# Patient Record
Sex: MALE | Race: WHITE | Employment: OTHER | URBAN - METROPOLITAN AREA
[De-identification: names, ages, dates, MRNs, and addresses within clinical notes are randomized per-mention and may not be internally consistent; named-entity substitution may affect disease eponyms.]

---

## 2020-01-01 ENCOUNTER — APPOINTMENT (OUTPATIENT)
Dept: GENERAL RADIOLOGY | Age: 80
DRG: 208 | End: 2020-01-01
Payer: MEDICARE

## 2020-01-01 ENCOUNTER — HOSPITAL ENCOUNTER (INPATIENT)
Age: 80
LOS: 9 days | DRG: 208 | End: 2020-12-14
Attending: EMERGENCY MEDICINE | Admitting: INTERNAL MEDICINE
Payer: MEDICARE

## 2020-01-01 VITALS
DIASTOLIC BLOOD PRESSURE: 45 MMHG | OXYGEN SATURATION: 81 % | SYSTOLIC BLOOD PRESSURE: 63 MMHG | RESPIRATION RATE: 18 BRPM | BODY MASS INDEX: 34.52 KG/M2 | WEIGHT: 254.85 LBS | TEMPERATURE: 93.5 F | HEART RATE: 105 BPM | HEIGHT: 72 IN

## 2020-01-01 LAB
A/G RATIO: 0.8 (ref 1.1–2.2)
A/G RATIO: 0.9 (ref 1.1–2.2)
A/G RATIO: 1 (ref 1.1–2.2)
A/G RATIO: 1.1 (ref 1.1–2.2)
A/G RATIO: 1.3 (ref 1.1–2.2)
ABO/RH: NORMAL
ABO/RH: NORMAL
ALBUMIN SERPL-MCNC: 2.4 G/DL (ref 3.4–5)
ALBUMIN SERPL-MCNC: 2.6 G/DL (ref 3.4–5)
ALBUMIN SERPL-MCNC: 2.7 G/DL (ref 3.4–5)
ALBUMIN SERPL-MCNC: 2.8 G/DL (ref 3.4–5)
ALBUMIN SERPL-MCNC: 2.9 G/DL (ref 3.4–5)
ALBUMIN SERPL-MCNC: 3 G/DL (ref 3.4–5)
ALBUMIN SERPL-MCNC: 3.3 G/DL (ref 3.4–5)
ALBUMIN SERPL-MCNC: 3.8 G/DL (ref 3.4–5)
ALP BLD-CCNC: 74 U/L (ref 40–129)
ALP BLD-CCNC: 76 U/L (ref 40–129)
ALP BLD-CCNC: 77 U/L (ref 40–129)
ALP BLD-CCNC: 78 U/L (ref 40–129)
ALP BLD-CCNC: 78 U/L (ref 40–129)
ALP BLD-CCNC: 81 U/L (ref 40–129)
ALP BLD-CCNC: 83 U/L (ref 40–129)
ALP BLD-CCNC: 83 U/L (ref 40–129)
ALP BLD-CCNC: 84 U/L (ref 40–129)
ALP BLD-CCNC: 85 U/L (ref 40–129)
ALT SERPL-CCNC: 40 U/L (ref 10–40)
ALT SERPL-CCNC: 4446 U/L (ref 10–40)
ALT SERPL-CCNC: 47 U/L (ref 10–40)
ALT SERPL-CCNC: 48 U/L (ref 10–40)
ALT SERPL-CCNC: 50 U/L (ref 10–40)
ALT SERPL-CCNC: 50 U/L (ref 10–40)
ALT SERPL-CCNC: 51 U/L (ref 10–40)
ALT SERPL-CCNC: 53 U/L (ref 10–40)
ALT SERPL-CCNC: 60 U/L (ref 10–40)
ALT SERPL-CCNC: 63 U/L (ref 10–40)
ANION GAP SERPL CALCULATED.3IONS-SCNC: 10 MMOL/L (ref 3–16)
ANION GAP SERPL CALCULATED.3IONS-SCNC: 13 MMOL/L (ref 3–16)
ANION GAP SERPL CALCULATED.3IONS-SCNC: 16 MMOL/L (ref 3–16)
ANION GAP SERPL CALCULATED.3IONS-SCNC: 27 MMOL/L (ref 3–16)
ANION GAP SERPL CALCULATED.3IONS-SCNC: 8 MMOL/L (ref 3–16)
ANION GAP SERPL CALCULATED.3IONS-SCNC: 8 MMOL/L (ref 3–16)
ANION GAP SERPL CALCULATED.3IONS-SCNC: 9 MMOL/L (ref 3–16)
ANION GAP SERPL CALCULATED.3IONS-SCNC: 9 MMOL/L (ref 3–16)
ANTIBODY SCREEN: NORMAL
ANTIBODY SCREEN: NORMAL
AST SERPL-CCNC: 16 U/L (ref 15–37)
AST SERPL-CCNC: 18 U/L (ref 15–37)
AST SERPL-CCNC: 21 U/L (ref 15–37)
AST SERPL-CCNC: 23 U/L (ref 15–37)
AST SERPL-CCNC: 25 U/L (ref 15–37)
AST SERPL-CCNC: 3249 U/L (ref 15–37)
AST SERPL-CCNC: 37 U/L (ref 15–37)
AST SERPL-CCNC: 41 U/L (ref 15–37)
AST SERPL-CCNC: 43 U/L (ref 15–37)
AST SERPL-CCNC: 48 U/L (ref 15–37)
ATYPICAL LYMPHOCYTE RELATIVE PERCENT: 2 % (ref 0–6)
ATYPICAL LYMPHOCYTE RELATIVE PERCENT: 3 % (ref 0–6)
BANDED NEUTROPHILS RELATIVE PERCENT: 1 % (ref 0–7)
BANDED NEUTROPHILS RELATIVE PERCENT: 2 % (ref 0–7)
BANDED NEUTROPHILS RELATIVE PERCENT: 6 % (ref 0–7)
BASE EXCESS ARTERIAL: -26 (ref -3–3)
BASOPHILS ABSOLUTE: 0 K/UL (ref 0–0.2)
BASOPHILS RELATIVE PERCENT: 0 %
BASOPHILS RELATIVE PERCENT: 0.2 %
BASOPHILS RELATIVE PERCENT: 0.2 %
BILIRUB SERPL-MCNC: 0.6 MG/DL (ref 0–1)
BILIRUB SERPL-MCNC: 0.6 MG/DL (ref 0–1)
BILIRUB SERPL-MCNC: 0.7 MG/DL (ref 0–1)
BILIRUB SERPL-MCNC: 0.8 MG/DL (ref 0–1)
BILIRUB SERPL-MCNC: 0.8 MG/DL (ref 0–1)
BILIRUB SERPL-MCNC: 0.9 MG/DL (ref 0–1)
BILIRUB SERPL-MCNC: 0.9 MG/DL (ref 0–1)
BILIRUB SERPL-MCNC: 1 MG/DL (ref 0–1)
BILIRUB SERPL-MCNC: 1.1 MG/DL (ref 0–1)
BILIRUB SERPL-MCNC: 1.2 MG/DL (ref 0–1)
BLOOD BANK DISPENSE STATUS: NORMAL
BLOOD BANK PRODUCT CODE: NORMAL
BLOOD CULTURE, ROUTINE: NORMAL
BPU ID: NORMAL
BUN BLDV-MCNC: 100 MG/DL (ref 7–20)
BUN BLDV-MCNC: 29 MG/DL (ref 7–20)
BUN BLDV-MCNC: 31 MG/DL (ref 7–20)
BUN BLDV-MCNC: 32 MG/DL (ref 7–20)
BUN BLDV-MCNC: 33 MG/DL (ref 7–20)
BUN BLDV-MCNC: 35 MG/DL (ref 7–20)
BUN BLDV-MCNC: 37 MG/DL (ref 7–20)
BUN BLDV-MCNC: 39 MG/DL (ref 7–20)
BUN BLDV-MCNC: 40 MG/DL (ref 7–20)
BUN BLDV-MCNC: 41 MG/DL (ref 7–20)
BURR CELLS: ABNORMAL
C DIFF TOXIN/ANTIGEN: NORMAL
C-REACTIVE PROTEIN: 16.8 MG/L (ref 0–5.1)
C-REACTIVE PROTEIN: 5.9 MG/L (ref 0–5.1)
C-REACTIVE PROTEIN: 6.2 MG/L (ref 0–5.1)
C-REACTIVE PROTEIN: 7.3 MG/L (ref 0–5.1)
CALCIUM SERPL-MCNC: 10.7 MG/DL (ref 8.3–10.6)
CALCIUM SERPL-MCNC: 8.5 MG/DL (ref 8.3–10.6)
CALCIUM SERPL-MCNC: 8.6 MG/DL (ref 8.3–10.6)
CALCIUM SERPL-MCNC: 8.6 MG/DL (ref 8.3–10.6)
CALCIUM SERPL-MCNC: 8.7 MG/DL (ref 8.3–10.6)
CALCIUM SERPL-MCNC: 8.9 MG/DL (ref 8.3–10.6)
CALCIUM SERPL-MCNC: 8.9 MG/DL (ref 8.3–10.6)
CALCIUM SERPL-MCNC: 9.7 MG/DL (ref 8.3–10.6)
CHLORIDE BLD-SCNC: 103 MMOL/L (ref 99–110)
CHLORIDE BLD-SCNC: 103 MMOL/L (ref 99–110)
CHLORIDE BLD-SCNC: 104 MMOL/L (ref 99–110)
CHLORIDE BLD-SCNC: 104 MMOL/L (ref 99–110)
CHLORIDE BLD-SCNC: 106 MMOL/L (ref 99–110)
CHLORIDE BLD-SCNC: 107 MMOL/L (ref 99–110)
CHLORIDE BLD-SCNC: 108 MMOL/L (ref 99–110)
CHLORIDE BLD-SCNC: 108 MMOL/L (ref 99–110)
CHLORIDE BLD-SCNC: 109 MMOL/L (ref 99–110)
CHLORIDE BLD-SCNC: 98 MMOL/L (ref 99–110)
CO2: 14 MMOL/L (ref 21–32)
CO2: 17 MMOL/L (ref 21–32)
CO2: 17 MMOL/L (ref 21–32)
CO2: 18 MMOL/L (ref 21–32)
CO2: 19 MMOL/L (ref 21–32)
CO2: 20 MMOL/L (ref 21–32)
CO2: 20 MMOL/L (ref 21–32)
CO2: 21 MMOL/L (ref 21–32)
CO2: 22 MMOL/L (ref 21–32)
CO2: 22 MMOL/L (ref 21–32)
CREAT SERPL-MCNC: 0.7 MG/DL (ref 0.8–1.3)
CREAT SERPL-MCNC: 0.8 MG/DL (ref 0.8–1.3)
CREAT SERPL-MCNC: 0.9 MG/DL (ref 0.8–1.3)
CREAT SERPL-MCNC: 1.1 MG/DL (ref 0.8–1.3)
CREAT SERPL-MCNC: 1.8 MG/DL (ref 0.8–1.3)
CULTURE, BLOOD 2: NORMAL
D DIMER: 1555 NG/ML DDU (ref 0–229)
D DIMER: 763 NG/ML DDU (ref 0–229)
D DIMER: 868 NG/ML DDU (ref 0–229)
D DIMER: 877 NG/ML DDU (ref 0–229)
DESCRIPTION BLOOD BANK: NORMAL
EKG ATRIAL RATE: 101 BPM
EKG DIAGNOSIS: NORMAL
EKG P AXIS: 86 DEGREES
EKG P-R INTERVAL: 224 MS
EKG Q-T INTERVAL: 360 MS
EKG QRS DURATION: 138 MS
EKG QTC CALCULATION (BAZETT): 466 MS
EKG R AXIS: -49 DEGREES
EKG T AXIS: 49 DEGREES
EKG VENTRICULAR RATE: 101 BPM
EMERGENCY ISSUE BLOOD PRODUCTS SIGNED FORM: NORMAL
EOSINOPHILS ABSOLUTE: 0 K/UL (ref 0–0.6)
EOSINOPHILS RELATIVE PERCENT: 0 %
FERRITIN: 458.2 NG/ML (ref 30–400)
FIBRINOGEN: 382 MG/DL (ref 200–397)
GFR AFRICAN AMERICAN: 44
GFR AFRICAN AMERICAN: >60
GFR NON-AFRICAN AMERICAN: 36
GFR NON-AFRICAN AMERICAN: >60
GLOBULIN: 2.1 G/DL
GLOBULIN: 2.8 G/DL
GLOBULIN: 2.9 G/DL
GLOBULIN: 3 G/DL
GLOBULIN: 3.3 G/DL
GLOBULIN: 3.4 G/DL
GLUCOSE BLD-MCNC: 156 MG/DL (ref 70–99)
GLUCOSE BLD-MCNC: 164 MG/DL (ref 70–99)
GLUCOSE BLD-MCNC: 172 MG/DL (ref 70–99)
GLUCOSE BLD-MCNC: 173 MG/DL (ref 70–99)
GLUCOSE BLD-MCNC: 175 MG/DL (ref 70–99)
GLUCOSE BLD-MCNC: 182 MG/DL (ref 70–99)
GLUCOSE BLD-MCNC: 183 MG/DL (ref 70–99)
GLUCOSE BLD-MCNC: 186 MG/DL (ref 70–99)
GLUCOSE BLD-MCNC: 192 MG/DL (ref 70–99)
GLUCOSE BLD-MCNC: 193 MG/DL (ref 70–99)
GLUCOSE BLD-MCNC: 197 MG/DL (ref 70–99)
GLUCOSE BLD-MCNC: 210 MG/DL (ref 70–99)
GLUCOSE BLD-MCNC: 212 MG/DL (ref 70–99)
GLUCOSE BLD-MCNC: 212 MG/DL (ref 70–99)
GLUCOSE BLD-MCNC: 216 MG/DL (ref 70–99)
GLUCOSE BLD-MCNC: 218 MG/DL (ref 70–99)
GLUCOSE BLD-MCNC: 229 MG/DL (ref 70–99)
GLUCOSE BLD-MCNC: 235 MG/DL (ref 70–99)
GLUCOSE BLD-MCNC: 236 MG/DL (ref 70–99)
GLUCOSE BLD-MCNC: 240 MG/DL (ref 70–99)
GLUCOSE BLD-MCNC: 243 MG/DL (ref 70–99)
GLUCOSE BLD-MCNC: 247 MG/DL (ref 70–99)
GLUCOSE BLD-MCNC: 252 MG/DL (ref 70–99)
GLUCOSE BLD-MCNC: 253 MG/DL (ref 70–99)
GLUCOSE BLD-MCNC: 256 MG/DL (ref 70–99)
GLUCOSE BLD-MCNC: 256 MG/DL (ref 70–99)
GLUCOSE BLD-MCNC: 259 MG/DL (ref 70–99)
GLUCOSE BLD-MCNC: 260 MG/DL (ref 70–99)
GLUCOSE BLD-MCNC: 260 MG/DL (ref 70–99)
GLUCOSE BLD-MCNC: 268 MG/DL (ref 70–99)
GLUCOSE BLD-MCNC: 280 MG/DL (ref 70–99)
GLUCOSE BLD-MCNC: 282 MG/DL (ref 70–99)
GLUCOSE BLD-MCNC: 288 MG/DL (ref 70–99)
GLUCOSE BLD-MCNC: 297 MG/DL (ref 70–99)
GLUCOSE BLD-MCNC: 316 MG/DL (ref 70–99)
GLUCOSE BLD-MCNC: 328 MG/DL (ref 70–99)
GLUCOSE BLD-MCNC: 329 MG/DL (ref 70–99)
GLUCOSE BLD-MCNC: 336 MG/DL (ref 70–99)
GLUCOSE BLD-MCNC: 337 MG/DL (ref 70–99)
GLUCOSE BLD-MCNC: 383 MG/DL (ref 70–99)
GLUCOSE BLD-MCNC: 402 MG/DL (ref 70–99)
GLUCOSE BLD-MCNC: 457 MG/DL (ref 70–99)
GLUCOSE BLD-MCNC: 494 MG/DL (ref 70–99)
GLUCOSE BLD-MCNC: 586 MG/DL (ref 70–99)
HCO3 ARTERIAL: 9.3 MMOL/L (ref 21–29)
HCT VFR BLD CALC: 33.4 % (ref 40.5–52.5)
HCT VFR BLD CALC: 43.7 % (ref 40.5–52.5)
HCT VFR BLD CALC: 43.8 % (ref 40.5–52.5)
HCT VFR BLD CALC: 43.9 % (ref 40.5–52.5)
HCT VFR BLD CALC: 45 % (ref 40.5–52.5)
HCT VFR BLD CALC: 45 % (ref 40.5–52.5)
HCT VFR BLD CALC: 45.4 % (ref 40.5–52.5)
HCT VFR BLD CALC: 45.5 % (ref 40.5–52.5)
HCT VFR BLD CALC: 45.7 % (ref 40.5–52.5)
HCT VFR BLD CALC: 50.3 % (ref 40.5–52.5)
HEMOGLOBIN: 10.1 G/DL (ref 13.5–17.5)
HEMOGLOBIN: 14.4 G/DL (ref 13.5–17.5)
HEMOGLOBIN: 14.6 G/DL (ref 13.5–17.5)
HEMOGLOBIN: 14.8 G/DL (ref 13.5–17.5)
HEMOGLOBIN: 14.9 G/DL (ref 13.5–17.5)
HEMOGLOBIN: 14.9 G/DL (ref 13.5–17.5)
HEMOGLOBIN: 15 G/DL (ref 13.5–17.5)
HEMOGLOBIN: 15 G/DL (ref 13.5–17.5)
HEMOGLOBIN: 15.3 G/DL (ref 13.5–17.5)
HEMOGLOBIN: 16.8 G/DL (ref 13.5–17.5)
LACTIC ACID: 18.4 MMOL/L (ref 0.4–2)
LYMPHOCYTES ABSOLUTE: 0.2 K/UL (ref 1–5.1)
LYMPHOCYTES ABSOLUTE: 0.4 K/UL (ref 1–5.1)
LYMPHOCYTES ABSOLUTE: 0.4 K/UL (ref 1–5.1)
LYMPHOCYTES ABSOLUTE: 0.5 K/UL (ref 1–5.1)
LYMPHOCYTES ABSOLUTE: 0.5 K/UL (ref 1–5.1)
LYMPHOCYTES ABSOLUTE: 0.6 K/UL (ref 1–5.1)
LYMPHOCYTES ABSOLUTE: 0.8 K/UL (ref 1–5.1)
LYMPHOCYTES ABSOLUTE: 0.8 K/UL (ref 1–5.1)
LYMPHOCYTES ABSOLUTE: 2 K/UL (ref 1–5.1)
LYMPHOCYTES ABSOLUTE: 2.3 K/UL (ref 1–5.1)
LYMPHOCYTES RELATIVE PERCENT: 1 %
LYMPHOCYTES RELATIVE PERCENT: 17 %
LYMPHOCYTES RELATIVE PERCENT: 4 %
LYMPHOCYTES RELATIVE PERCENT: 4.3 %
LYMPHOCYTES RELATIVE PERCENT: 5.3 %
LYMPHOCYTES RELATIVE PERCENT: 7 %
LYMPHOCYTES RELATIVE PERCENT: 7 %
MACROCYTES: ABNORMAL
MCH RBC QN AUTO: 31.8 PG (ref 26–34)
MCH RBC QN AUTO: 32 PG (ref 26–34)
MCH RBC QN AUTO: 32.2 PG (ref 26–34)
MCH RBC QN AUTO: 32.3 PG (ref 26–34)
MCH RBC QN AUTO: 32.4 PG (ref 26–34)
MCH RBC QN AUTO: 32.5 PG (ref 26–34)
MCH RBC QN AUTO: 32.7 PG (ref 26–34)
MCH RBC QN AUTO: 32.8 PG (ref 26–34)
MCHC RBC AUTO-ENTMCNC: 30.3 G/DL (ref 31–36)
MCHC RBC AUTO-ENTMCNC: 32.9 G/DL (ref 31–36)
MCHC RBC AUTO-ENTMCNC: 33 G/DL (ref 31–36)
MCHC RBC AUTO-ENTMCNC: 33 G/DL (ref 31–36)
MCHC RBC AUTO-ENTMCNC: 33.1 G/DL (ref 31–36)
MCHC RBC AUTO-ENTMCNC: 33.2 G/DL (ref 31–36)
MCHC RBC AUTO-ENTMCNC: 33.2 G/DL (ref 31–36)
MCHC RBC AUTO-ENTMCNC: 33.4 G/DL (ref 31–36)
MCHC RBC AUTO-ENTMCNC: 33.5 G/DL (ref 31–36)
MCHC RBC AUTO-ENTMCNC: 33.6 G/DL (ref 31–36)
MCV RBC AUTO: 104.7 FL (ref 80–100)
MCV RBC AUTO: 96.7 FL (ref 80–100)
MCV RBC AUTO: 97.1 FL (ref 80–100)
MCV RBC AUTO: 97.1 FL (ref 80–100)
MCV RBC AUTO: 97.2 FL (ref 80–100)
MCV RBC AUTO: 97.6 FL (ref 80–100)
MCV RBC AUTO: 97.8 FL (ref 80–100)
MCV RBC AUTO: 98.1 FL (ref 80–100)
MCV RBC AUTO: 98.4 FL (ref 80–100)
MCV RBC AUTO: 98.5 FL (ref 80–100)
METAMYELOCYTES RELATIVE PERCENT: 1 %
METAMYELOCYTES RELATIVE PERCENT: 1 %
METAMYELOCYTES RELATIVE PERCENT: 2 %
METAMYELOCYTES RELATIVE PERCENT: 4 %
MONOCYTES ABSOLUTE: 0.5 K/UL (ref 0–1.3)
MONOCYTES ABSOLUTE: 0.6 K/UL (ref 0–1.3)
MONOCYTES ABSOLUTE: 0.8 K/UL (ref 0–1.3)
MONOCYTES ABSOLUTE: 0.9 K/UL (ref 0–1.3)
MONOCYTES ABSOLUTE: 0.9 K/UL (ref 0–1.3)
MONOCYTES ABSOLUTE: 1.3 K/UL (ref 0–1.3)
MONOCYTES ABSOLUTE: 1.4 K/UL (ref 0–1.3)
MONOCYTES ABSOLUTE: 3.3 K/UL (ref 0–1.3)
MONOCYTES RELATIVE PERCENT: 10 %
MONOCYTES RELATIVE PERCENT: 10.2 %
MONOCYTES RELATIVE PERCENT: 11 %
MONOCYTES RELATIVE PERCENT: 4 %
MONOCYTES RELATIVE PERCENT: 6 %
MONOCYTES RELATIVE PERCENT: 6 %
MONOCYTES RELATIVE PERCENT: 7.6 %
MONOCYTES RELATIVE PERCENT: 8 %
MONOCYTES RELATIVE PERCENT: 8 %
MONOCYTES RELATIVE PERCENT: 9 %
MYELOCYTE PERCENT: 1 %
MYELOCYTE PERCENT: 3 %
MYELOCYTE PERCENT: 4 %
MYELOCYTE PERCENT: 5 %
NEUTROPHILS ABSOLUTE: 11.4 K/UL (ref 1.7–7.7)
NEUTROPHILS ABSOLUTE: 16.4 K/UL (ref 1.7–7.7)
NEUTROPHILS ABSOLUTE: 27.7 K/UL (ref 1.7–7.7)
NEUTROPHILS ABSOLUTE: 6.8 K/UL (ref 1.7–7.7)
NEUTROPHILS ABSOLUTE: 8.5 K/UL (ref 1.7–7.7)
NEUTROPHILS ABSOLUTE: 9 K/UL (ref 1.7–7.7)
NEUTROPHILS ABSOLUTE: 9.1 K/UL (ref 1.7–7.7)
NEUTROPHILS ABSOLUTE: 9.4 K/UL (ref 1.7–7.7)
NEUTROPHILS ABSOLUTE: 9.5 K/UL (ref 1.7–7.7)
NEUTROPHILS ABSOLUTE: 9.7 K/UL (ref 1.7–7.7)
NEUTROPHILS RELATIVE PERCENT: 75 %
NEUTROPHILS RELATIVE PERCENT: 76 %
NEUTROPHILS RELATIVE PERCENT: 78 %
NEUTROPHILS RELATIVE PERCENT: 83 %
NEUTROPHILS RELATIVE PERCENT: 84 %
NEUTROPHILS RELATIVE PERCENT: 84.3 %
NEUTROPHILS RELATIVE PERCENT: 85 %
NEUTROPHILS RELATIVE PERCENT: 86 %
NEUTROPHILS RELATIVE PERCENT: 86 %
NEUTROPHILS RELATIVE PERCENT: 87.9 %
O2 SAT, ARTERIAL: 62 % (ref 93–100)
OCCULT BLOOD DIAGNOSTIC: ABNORMAL
PCO2 ARTERIAL: 70.5 MM HG (ref 35–45)
PDW BLD-RTO: 13.4 % (ref 12.4–15.4)
PDW BLD-RTO: 13.6 % (ref 12.4–15.4)
PDW BLD-RTO: 13.7 % (ref 12.4–15.4)
PDW BLD-RTO: 13.9 % (ref 12.4–15.4)
PDW BLD-RTO: 14 % (ref 12.4–15.4)
PDW BLD-RTO: 14 % (ref 12.4–15.4)
PDW BLD-RTO: 14.5 % (ref 12.4–15.4)
PERFORMED ON: ABNORMAL
PH ARTERIAL: 6.73 (ref 7.35–7.45)
PLATELET # BLD: 175 K/UL (ref 135–450)
PLATELET # BLD: 189 K/UL (ref 135–450)
PLATELET # BLD: 215 K/UL (ref 135–450)
PLATELET # BLD: 216 K/UL (ref 135–450)
PLATELET # BLD: 226 K/UL (ref 135–450)
PLATELET # BLD: 230 K/UL (ref 135–450)
PLATELET # BLD: 240 K/UL (ref 135–450)
PLATELET # BLD: 243 K/UL (ref 135–450)
PLATELET # BLD: 251 K/UL (ref 135–450)
PLATELET # BLD: 256 K/UL (ref 135–450)
PLATELET SLIDE REVIEW: ADEQUATE
PMV BLD AUTO: 10 FL (ref 5–10.5)
PMV BLD AUTO: 10.2 FL (ref 5–10.5)
PMV BLD AUTO: 10.3 FL (ref 5–10.5)
PMV BLD AUTO: 10.3 FL (ref 5–10.5)
PMV BLD AUTO: 10.4 FL (ref 5–10.5)
PMV BLD AUTO: 10.5 FL (ref 5–10.5)
PMV BLD AUTO: 10.9 FL (ref 5–10.5)
PMV BLD AUTO: 11 FL (ref 5–10.5)
PMV BLD AUTO: 11.1 FL (ref 5–10.5)
PMV BLD AUTO: 9.8 FL (ref 5–10.5)
PO2 ARTERIAL: 64.5 MM HG (ref 75–108)
POC SAMPLE TYPE: ABNORMAL
POTASSIUM REFLEX MAGNESIUM: 4.4 MMOL/L (ref 3.5–5.1)
POTASSIUM REFLEX MAGNESIUM: 4.4 MMOL/L (ref 3.5–5.1)
POTASSIUM REFLEX MAGNESIUM: 4.6 MMOL/L (ref 3.5–5.1)
POTASSIUM REFLEX MAGNESIUM: 4.7 MMOL/L (ref 3.5–5.1)
POTASSIUM REFLEX MAGNESIUM: 4.7 MMOL/L (ref 3.5–5.1)
POTASSIUM REFLEX MAGNESIUM: 5 MMOL/L (ref 3.5–5.1)
POTASSIUM REFLEX MAGNESIUM: 5.8 MMOL/L (ref 3.5–5.1)
PRO-BNP: 441 PG/ML (ref 0–449)
PROCALCITONIN: 0.12 NG/ML (ref 0–0.15)
RBC # BLD: 3.19 M/UL (ref 4.2–5.9)
RBC # BLD: 4.43 M/UL (ref 4.2–5.9)
RBC # BLD: 4.49 M/UL (ref 4.2–5.9)
RBC # BLD: 4.51 M/UL (ref 4.2–5.9)
RBC # BLD: 4.58 M/UL (ref 4.2–5.9)
RBC # BLD: 4.62 M/UL (ref 4.2–5.9)
RBC # BLD: 4.63 M/UL (ref 4.2–5.9)
RBC # BLD: 4.67 M/UL (ref 4.2–5.9)
RBC # BLD: 4.73 M/UL (ref 4.2–5.9)
RBC # BLD: 5.14 M/UL (ref 4.2–5.9)
RBC # BLD: NORMAL 10*6/UL
SARS-COV-2: POSITIVE
SLIDE REVIEW: ABNORMAL
SODIUM BLD-SCNC: 133 MMOL/L (ref 136–145)
SODIUM BLD-SCNC: 134 MMOL/L (ref 136–145)
SODIUM BLD-SCNC: 135 MMOL/L (ref 136–145)
SODIUM BLD-SCNC: 136 MMOL/L (ref 136–145)
SODIUM BLD-SCNC: 136 MMOL/L (ref 136–145)
SODIUM BLD-SCNC: 138 MMOL/L (ref 136–145)
SODIUM BLD-SCNC: 139 MMOL/L (ref 136–145)
SODIUM BLD-SCNC: 139 MMOL/L (ref 136–145)
TCO2 ARTERIAL: 12 MMOL/L
TOTAL PROTEIN: 4.5 G/DL (ref 6.4–8.2)
TOTAL PROTEIN: 5.5 G/DL (ref 6.4–8.2)
TOTAL PROTEIN: 5.6 G/DL (ref 6.4–8.2)
TOTAL PROTEIN: 5.6 G/DL (ref 6.4–8.2)
TOTAL PROTEIN: 5.8 G/DL (ref 6.4–8.2)
TOTAL PROTEIN: 5.9 G/DL (ref 6.4–8.2)
TOTAL PROTEIN: 6.7 G/DL (ref 6.4–8.2)
TOTAL PROTEIN: 6.8 G/DL (ref 6.4–8.2)
TROPONIN: 0.02 NG/ML
TROPONIN: 0.07 NG/ML
TROPONIN: <0.01 NG/ML
WBC # BLD: 10.3 K/UL (ref 4–11)
WBC # BLD: 10.7 K/UL (ref 4–11)
WBC # BLD: 11.4 K/UL (ref 4–11)
WBC # BLD: 11.5 K/UL (ref 4–11)
WBC # BLD: 11.6 K/UL (ref 4–11)
WBC # BLD: 12.7 K/UL (ref 4–11)
WBC # BLD: 18 K/UL (ref 4–11)
WBC # BLD: 33.4 K/UL (ref 4–11)
WBC # BLD: 8.1 K/UL (ref 4–11)
WBC # BLD: 9.7 K/UL (ref 4–11)

## 2020-01-01 PROCEDURE — 94761 N-INVAS EAR/PLS OXIMETRY MLT: CPT

## 2020-01-01 PROCEDURE — 2700000000 HC OXYGEN THERAPY PER DAY

## 2020-01-01 PROCEDURE — 2500000003 HC RX 250 WO HCPCS: Performed by: INTERNAL MEDICINE

## 2020-01-01 PROCEDURE — 99233 SBSQ HOSP IP/OBS HIGH 50: CPT | Performed by: INTERNAL MEDICINE

## 2020-01-01 PROCEDURE — 2580000003 HC RX 258: Performed by: INTERNAL MEDICINE

## 2020-01-01 PROCEDURE — 6360000002 HC RX W HCPCS: Performed by: INTERNAL MEDICINE

## 2020-01-01 PROCEDURE — 93010 ELECTROCARDIOGRAM REPORT: CPT | Performed by: INTERNAL MEDICINE

## 2020-01-01 PROCEDURE — 6370000000 HC RX 637 (ALT 250 FOR IP): Performed by: INTERNAL MEDICINE

## 2020-01-01 PROCEDURE — 80053 COMPREHEN METABOLIC PANEL: CPT

## 2020-01-01 PROCEDURE — 85025 COMPLETE CBC W/AUTO DIFF WBC: CPT

## 2020-01-01 PROCEDURE — 2060000000 HC ICU INTERMEDIATE R&B

## 2020-01-01 PROCEDURE — 2580000003 HC RX 258: Performed by: PHYSICIAN ASSISTANT

## 2020-01-01 PROCEDURE — G0328 FECAL BLOOD SCRN IMMUNOASSAY: HCPCS

## 2020-01-01 PROCEDURE — 2000000000 HC ICU R&B

## 2020-01-01 PROCEDURE — 0W3P8ZZ CONTROL BLEEDING IN GASTROINTESTINAL TRACT, VIA NATURAL OR ARTIFICIAL OPENING ENDOSCOPIC: ICD-10-PCS | Performed by: INTERNAL MEDICINE

## 2020-01-01 PROCEDURE — 99291 CRITICAL CARE FIRST HOUR: CPT | Performed by: INTERNAL MEDICINE

## 2020-01-01 PROCEDURE — XW13325 TRANSFUSION OF CONVALESCENT PLASMA (NONAUTOLOGOUS) INTO PERIPHERAL VEIN, PERCUTANEOUS APPROACH, NEW TECHNOLOGY GROUP 5: ICD-10-PCS | Performed by: INTERNAL MEDICINE

## 2020-01-01 PROCEDURE — 85379 FIBRIN DEGRADATION QUANT: CPT

## 2020-01-01 PROCEDURE — 93005 ELECTROCARDIOGRAM TRACING: CPT | Performed by: EMERGENCY MEDICINE

## 2020-01-01 PROCEDURE — C9113 INJ PANTOPRAZOLE SODIUM, VIA: HCPCS | Performed by: INTERNAL MEDICINE

## 2020-01-01 PROCEDURE — XW033E5 INTRODUCTION OF REMDESIVIR ANTI-INFECTIVE INTO PERIPHERAL VEIN, PERCUTANEOUS APPROACH, NEW TECHNOLOGY GROUP 5: ICD-10-PCS | Performed by: INTERNAL MEDICINE

## 2020-01-01 PROCEDURE — 36415 COLL VENOUS BLD VENIPUNCTURE: CPT

## 2020-01-01 PROCEDURE — 97162 PT EVAL MOD COMPLEX 30 MIN: CPT

## 2020-01-01 PROCEDURE — 87449 NOS EACH ORGANISM AG IA: CPT

## 2020-01-01 PROCEDURE — P9016 RBC LEUKOCYTES REDUCED: HCPCS

## 2020-01-01 PROCEDURE — 71045 X-RAY EXAM CHEST 1 VIEW: CPT

## 2020-01-01 PROCEDURE — 88342 IMHCHEM/IMCYTCHM 1ST ANTB: CPT

## 2020-01-01 PROCEDURE — P9017 PLASMA 1 DONOR FRZ W/IN 8 HR: HCPCS

## 2020-01-01 PROCEDURE — 0DB68ZX EXCISION OF STOMACH, VIA NATURAL OR ARTIFICIAL OPENING ENDOSCOPIC, DIAGNOSTIC: ICD-10-PCS | Performed by: INTERNAL MEDICINE

## 2020-01-01 PROCEDURE — 84484 ASSAY OF TROPONIN QUANT: CPT

## 2020-01-01 PROCEDURE — 88305 TISSUE EXAM BY PATHOLOGIST: CPT

## 2020-01-01 PROCEDURE — 86140 C-REACTIVE PROTEIN: CPT

## 2020-01-01 PROCEDURE — 82803 BLOOD GASES ANY COMBINATION: CPT

## 2020-01-01 PROCEDURE — 85384 FIBRINOGEN ACTIVITY: CPT

## 2020-01-01 PROCEDURE — 86901 BLOOD TYPING SEROLOGIC RH(D): CPT

## 2020-01-01 PROCEDURE — 87040 BLOOD CULTURE FOR BACTERIA: CPT

## 2020-01-01 PROCEDURE — 86900 BLOOD TYPING SEROLOGIC ABO: CPT

## 2020-01-01 PROCEDURE — 99223 1ST HOSP IP/OBS HIGH 75: CPT | Performed by: INTERNAL MEDICINE

## 2020-01-01 PROCEDURE — 94770 HC ETCO2 MONITOR DAILY: CPT

## 2020-01-01 PROCEDURE — 97165 OT EVAL LOW COMPLEX 30 MIN: CPT

## 2020-01-01 PROCEDURE — 82728 ASSAY OF FERRITIN: CPT

## 2020-01-01 PROCEDURE — 3609013000 HC EGD TRANSORAL CONTROL BLEEDING ANY METHOD: Performed by: INTERNAL MEDICINE

## 2020-01-01 PROCEDURE — 97530 THERAPEUTIC ACTIVITIES: CPT

## 2020-01-01 PROCEDURE — 3609012400 HC EGD TRANSORAL BIOPSY SINGLE/MULTIPLE: Performed by: INTERNAL MEDICINE

## 2020-01-01 PROCEDURE — 83880 ASSAY OF NATRIURETIC PEPTIDE: CPT

## 2020-01-01 PROCEDURE — 97110 THERAPEUTIC EXERCISES: CPT

## 2020-01-01 PROCEDURE — 2500000003 HC RX 250 WO HCPCS: Performed by: NURSE PRACTITIONER

## 2020-01-01 PROCEDURE — 87324 CLOSTRIDIUM AG IA: CPT

## 2020-01-01 PROCEDURE — 06HY33Z INSERTION OF INFUSION DEVICE INTO LOWER VEIN, PERCUTANEOUS APPROACH: ICD-10-PCS | Performed by: INTERNAL MEDICINE

## 2020-01-01 PROCEDURE — P9040 RBC LEUKOREDUCED IRRADIATED: HCPCS

## 2020-01-01 PROCEDURE — 1200000000 HC SEMI PRIVATE

## 2020-01-01 PROCEDURE — 84145 PROCALCITONIN (PCT): CPT

## 2020-01-01 PROCEDURE — 86923 COMPATIBILITY TEST ELECTRIC: CPT

## 2020-01-01 PROCEDURE — 99283 EMERGENCY DEPT VISIT LOW MDM: CPT

## 2020-01-01 PROCEDURE — 93005 ELECTROCARDIOGRAM TRACING: CPT | Performed by: INTERNAL MEDICINE

## 2020-01-01 PROCEDURE — P9037 PLATE PHERES LEUKOREDU IRRAD: HCPCS

## 2020-01-01 PROCEDURE — 5A1935Z RESPIRATORY VENTILATION, LESS THAN 24 CONSECUTIVE HOURS: ICD-10-PCS | Performed by: INTERNAL MEDICINE

## 2020-01-01 PROCEDURE — 86850 RBC ANTIBODY SCREEN: CPT

## 2020-01-01 PROCEDURE — 94640 AIRWAY INHALATION TREATMENT: CPT

## 2020-01-01 PROCEDURE — 2720000010 HC SURG SUPPLY STERILE: Performed by: INTERNAL MEDICINE

## 2020-01-01 PROCEDURE — 83605 ASSAY OF LACTIC ACID: CPT

## 2020-01-01 PROCEDURE — 6360000002 HC RX W HCPCS: Performed by: PHYSICIAN ASSISTANT

## 2020-01-01 PROCEDURE — 0BH18EZ INSERTION OF ENDOTRACHEAL AIRWAY INTO TRACHEA, VIA NATURAL OR ARTIFICIAL OPENING ENDOSCOPIC: ICD-10-PCS | Performed by: INTERNAL MEDICINE

## 2020-01-01 PROCEDURE — 6370000000 HC RX 637 (ALT 250 FOR IP): Performed by: PHYSICIAN ASSISTANT

## 2020-01-01 PROCEDURE — 94003 VENT MGMT INPAT SUBQ DAY: CPT

## 2020-01-01 PROCEDURE — 86920 COMPATIBILITY TEST SPIN: CPT

## 2020-01-01 PROCEDURE — 94002 VENT MGMT INPAT INIT DAY: CPT

## 2020-01-01 PROCEDURE — 36430 TRANSFUSION BLD/BLD COMPNT: CPT

## 2020-01-01 PROCEDURE — 2709999900 HC NON-CHARGEABLE SUPPLY: Performed by: INTERNAL MEDICINE

## 2020-01-01 RX ORDER — VITAMIN B COMPLEX
1000 TABLET ORAL DAILY
Status: DISCONTINUED | OUTPATIENT
Start: 2020-01-01 | End: 2020-01-01 | Stop reason: HOSPADM

## 2020-01-01 RX ORDER — IPRATROPIUM BROMIDE AND ALBUTEROL SULFATE 2.5; .5 MG/3ML; MG/3ML
1 SOLUTION RESPIRATORY (INHALATION) ONCE
Status: COMPLETED | OUTPATIENT
Start: 2020-01-01 | End: 2020-01-01

## 2020-01-01 RX ORDER — DEXAMETHASONE SODIUM PHOSPHATE 10 MG/ML
10 INJECTION, SOLUTION INTRAMUSCULAR; INTRAVENOUS ONCE
Status: COMPLETED | OUTPATIENT
Start: 2020-01-01 | End: 2020-01-01

## 2020-01-01 RX ORDER — PIOGLITAZONEHYDROCHLORIDE 15 MG/1
15 TABLET ORAL 2 TIMES DAILY
Status: DISCONTINUED | OUTPATIENT
Start: 2020-01-01 | End: 2020-01-01

## 2020-01-01 RX ORDER — PIOGLITAZONEHYDROCHLORIDE 15 MG/1
15 TABLET ORAL 2 TIMES DAILY
COMMUNITY

## 2020-01-01 RX ORDER — NICOTINE POLACRILEX 4 MG
15 LOZENGE BUCCAL PRN
Status: DISCONTINUED | OUTPATIENT
Start: 2020-01-01 | End: 2020-01-01 | Stop reason: HOSPADM

## 2020-01-01 RX ORDER — 0.9 % SODIUM CHLORIDE 0.9 %
30 INTRAVENOUS SOLUTION INTRAVENOUS PRN
Status: DISCONTINUED | OUTPATIENT
Start: 2020-01-01 | End: 2020-01-01 | Stop reason: HOSPADM

## 2020-01-01 RX ORDER — EPINEPHRINE 0.1 MG/ML
SYRINGE (ML) INJECTION
Status: DISPENSED
Start: 2020-01-01 | End: 2020-01-01

## 2020-01-01 RX ORDER — LISINOPRIL 10 MG/1
10 TABLET ORAL DAILY
Status: DISCONTINUED | OUTPATIENT
Start: 2020-01-01 | End: 2020-01-01

## 2020-01-01 RX ORDER — PROMETHAZINE HYDROCHLORIDE 25 MG/1
12.5 TABLET ORAL EVERY 6 HOURS PRN
Status: DISCONTINUED | OUTPATIENT
Start: 2020-01-01 | End: 2020-01-01

## 2020-01-01 RX ORDER — ACETAMINOPHEN 325 MG/1
650 TABLET ORAL EVERY 6 HOURS PRN
Status: DISCONTINUED | OUTPATIENT
Start: 2020-01-01 | End: 2020-01-01

## 2020-01-01 RX ORDER — ACETAMINOPHEN 650 MG/1
650 SUPPOSITORY RECTAL EVERY 6 HOURS PRN
Status: DISCONTINUED | OUTPATIENT
Start: 2020-01-01 | End: 2020-01-01 | Stop reason: HOSPADM

## 2020-01-01 RX ORDER — TAMSULOSIN HYDROCHLORIDE 0.4 MG/1
0.4 CAPSULE ORAL 2 TIMES DAILY
COMMUNITY

## 2020-01-01 RX ORDER — TAMSULOSIN HYDROCHLORIDE 0.4 MG/1
0.4 CAPSULE ORAL DAILY
Status: DISCONTINUED | OUTPATIENT
Start: 2020-01-01 | End: 2020-01-01 | Stop reason: HOSPADM

## 2020-01-01 RX ORDER — SODIUM CHLORIDE 0.9 % (FLUSH) 0.9 %
10 SYRINGE (ML) INJECTION PRN
Status: DISCONTINUED | OUTPATIENT
Start: 2020-01-01 | End: 2020-01-01 | Stop reason: HOSPADM

## 2020-01-01 RX ORDER — LORAZEPAM 2 MG/ML
4 INJECTION INTRAMUSCULAR
Status: DISCONTINUED | OUTPATIENT
Start: 2020-01-01 | End: 2020-01-01 | Stop reason: HOSPADM

## 2020-01-01 RX ORDER — DEXTROSE MONOHYDRATE 25 G/50ML
12.5 INJECTION, SOLUTION INTRAVENOUS PRN
Status: DISCONTINUED | OUTPATIENT
Start: 2020-01-01 | End: 2020-01-01 | Stop reason: HOSPADM

## 2020-01-01 RX ORDER — ASPIRIN 81 MG/1
81 TABLET, CHEWABLE ORAL DAILY
COMMUNITY

## 2020-01-01 RX ORDER — ROCURONIUM BROMIDE 10 MG/ML
INJECTION, SOLUTION INTRAVENOUS
Status: DISPENSED
Start: 2020-01-01 | End: 2020-01-01

## 2020-01-01 RX ORDER — HYDROCODONE BITARTRATE AND ACETAMINOPHEN 5; 325 MG/1; MG/1
1 TABLET ORAL EVERY 4 HOURS PRN
Status: DISCONTINUED | OUTPATIENT
Start: 2020-01-01 | End: 2020-01-01 | Stop reason: HOSPADM

## 2020-01-01 RX ORDER — DILTIAZEM HYDROCHLORIDE 5 MG/ML
10 INJECTION INTRAVENOUS ONCE
Status: COMPLETED | OUTPATIENT
Start: 2020-01-01 | End: 2020-01-01

## 2020-01-01 RX ORDER — DEXAMETHASONE 4 MG/1
4 TABLET ORAL EVERY 12 HOURS SCHEDULED
Status: DISCONTINUED | OUTPATIENT
Start: 2020-01-01 | End: 2020-01-01

## 2020-01-01 RX ORDER — DEXAMETHASONE 4 MG/1
6 TABLET ORAL DAILY
Status: DISCONTINUED | OUTPATIENT
Start: 2020-01-01 | End: 2020-01-01

## 2020-01-01 RX ORDER — DEXTROSE MONOHYDRATE 50 MG/ML
100 INJECTION, SOLUTION INTRAVENOUS PRN
Status: DISCONTINUED | OUTPATIENT
Start: 2020-01-01 | End: 2020-01-01 | Stop reason: HOSPADM

## 2020-01-01 RX ORDER — MELOXICAM 15 MG/1
15 TABLET ORAL DAILY
COMMUNITY

## 2020-01-01 RX ORDER — METHYLPREDNISOLONE SODIUM SUCCINATE 40 MG/ML
40 INJECTION, POWDER, LYOPHILIZED, FOR SOLUTION INTRAMUSCULAR; INTRAVENOUS EVERY 6 HOURS
Status: DISCONTINUED | OUTPATIENT
Start: 2020-01-01 | End: 2020-01-01 | Stop reason: HOSPADM

## 2020-01-01 RX ORDER — MORPHINE SULFATE 4 MG/ML
4 INJECTION, SOLUTION INTRAMUSCULAR; INTRAVENOUS
Status: DISCONTINUED | OUTPATIENT
Start: 2020-01-01 | End: 2020-01-01 | Stop reason: HOSPADM

## 2020-01-01 RX ORDER — 0.9 % SODIUM CHLORIDE 0.9 %
20 INTRAVENOUS SOLUTION INTRAVENOUS ONCE
Status: COMPLETED | OUTPATIENT
Start: 2020-01-01 | End: 2020-01-01

## 2020-01-01 RX ORDER — ATORVASTATIN CALCIUM 40 MG/1
40 TABLET, FILM COATED ORAL DAILY
COMMUNITY

## 2020-01-01 RX ORDER — INSULIN LISPRO 100 [IU]/ML
0-12 INJECTION, SOLUTION INTRAVENOUS; SUBCUTANEOUS
Status: DISCONTINUED | OUTPATIENT
Start: 2020-01-01 | End: 2020-01-01 | Stop reason: HOSPADM

## 2020-01-01 RX ORDER — SODIUM CHLORIDE 0.9 % (FLUSH) 0.9 %
10 SYRINGE (ML) INJECTION EVERY 12 HOURS SCHEDULED
Status: DISCONTINUED | OUTPATIENT
Start: 2020-01-01 | End: 2020-01-01 | Stop reason: HOSPADM

## 2020-01-01 RX ORDER — INSULIN LISPRO 100 [IU]/ML
0-3 INJECTION, SOLUTION INTRAVENOUS; SUBCUTANEOUS NIGHTLY
Status: DISCONTINUED | OUTPATIENT
Start: 2020-01-01 | End: 2020-01-01

## 2020-01-01 RX ORDER — ASCORBIC ACID 500 MG
500 TABLET ORAL DAILY
Status: DISCONTINUED | OUTPATIENT
Start: 2020-01-01 | End: 2020-01-01 | Stop reason: HOSPADM

## 2020-01-01 RX ORDER — INSULIN LISPRO 100 [IU]/ML
0-6 INJECTION, SOLUTION INTRAVENOUS; SUBCUTANEOUS
Status: DISCONTINUED | OUTPATIENT
Start: 2020-01-01 | End: 2020-01-01

## 2020-01-01 RX ORDER — POLYETHYLENE GLYCOL 3350 17 G/17G
17 POWDER, FOR SOLUTION ORAL DAILY PRN
Status: DISCONTINUED | OUTPATIENT
Start: 2020-01-01 | End: 2020-01-01 | Stop reason: HOSPADM

## 2020-01-01 RX ORDER — ACETAMINOPHEN 325 MG/1
650 TABLET ORAL EVERY 6 HOURS PRN
Status: DISCONTINUED | OUTPATIENT
Start: 2020-01-01 | End: 2020-01-01 | Stop reason: HOSPADM

## 2020-01-01 RX ORDER — SODIUM CHLORIDE 9 MG/ML
INJECTION, SOLUTION INTRAVENOUS CONTINUOUS
Status: DISCONTINUED | OUTPATIENT
Start: 2020-01-01 | End: 2020-01-01

## 2020-01-01 RX ORDER — ETOMIDATE 2 MG/ML
INJECTION INTRAVENOUS
Status: DISPENSED
Start: 2020-01-01 | End: 2020-01-01

## 2020-01-01 RX ORDER — ONDANSETRON 2 MG/ML
4 INJECTION INTRAMUSCULAR; INTRAVENOUS EVERY 6 HOURS PRN
Status: DISCONTINUED | OUTPATIENT
Start: 2020-01-01 | End: 2020-01-01 | Stop reason: HOSPADM

## 2020-01-01 RX ORDER — PROMETHAZINE HYDROCHLORIDE 25 MG/1
12.5 TABLET ORAL EVERY 6 HOURS PRN
Status: DISCONTINUED | OUTPATIENT
Start: 2020-01-01 | End: 2020-01-01 | Stop reason: HOSPADM

## 2020-01-01 RX ORDER — ACETAMINOPHEN 650 MG/1
650 SUPPOSITORY RECTAL EVERY 6 HOURS PRN
Status: DISCONTINUED | OUTPATIENT
Start: 2020-01-01 | End: 2020-01-01

## 2020-01-01 RX ORDER — ALOGLIPTIN 6.25 MG/1
25 TABLET, FILM COATED ORAL DAILY
Status: DISCONTINUED | OUTPATIENT
Start: 2020-01-01 | End: 2020-01-01

## 2020-01-01 RX ORDER — LISINOPRIL 10 MG/1
10 TABLET ORAL DAILY
COMMUNITY

## 2020-01-01 RX ORDER — EPINEPHRINE 0.1 MG/ML
SYRINGE (ML) INJECTION PRN
Status: DISCONTINUED | OUTPATIENT
Start: 2020-01-01 | End: 2020-01-01 | Stop reason: ALTCHOICE

## 2020-01-01 RX ORDER — ONDANSETRON 2 MG/ML
4 INJECTION INTRAMUSCULAR; INTRAVENOUS EVERY 6 HOURS PRN
Status: DISCONTINUED | OUTPATIENT
Start: 2020-01-01 | End: 2020-01-01

## 2020-01-01 RX ORDER — INSULIN LISPRO 100 [IU]/ML
0-6 INJECTION, SOLUTION INTRAVENOUS; SUBCUTANEOUS NIGHTLY
Status: DISCONTINUED | OUTPATIENT
Start: 2020-01-01 | End: 2020-01-01 | Stop reason: HOSPADM

## 2020-01-01 RX ORDER — ATORVASTATIN CALCIUM 40 MG/1
40 TABLET, FILM COATED ORAL NIGHTLY
Status: DISCONTINUED | OUTPATIENT
Start: 2020-01-01 | End: 2020-01-01

## 2020-01-01 RX ORDER — POLYETHYLENE GLYCOL 3350 17 G/17G
17 POWDER, FOR SOLUTION ORAL DAILY PRN
Status: DISCONTINUED | OUTPATIENT
Start: 2020-01-01 | End: 2020-01-01

## 2020-01-01 RX ORDER — ZINC SULFATE 50(220)MG
50 CAPSULE ORAL DAILY
Status: DISCONTINUED | OUTPATIENT
Start: 2020-01-01 | End: 2020-01-01 | Stop reason: HOSPADM

## 2020-01-01 RX ADMIN — Medication 10 ML: at 21:43

## 2020-01-01 RX ADMIN — Medication 10 ML: at 20:15

## 2020-01-01 RX ADMIN — INSULIN LISPRO 6 UNITS: 100 INJECTION, SOLUTION INTRAVENOUS; SUBCUTANEOUS at 17:51

## 2020-01-01 RX ADMIN — TAMSULOSIN HYDROCHLORIDE 0.4 MG: 0.4 CAPSULE ORAL at 08:05

## 2020-01-01 RX ADMIN — INSULIN LISPRO 3 UNITS: 100 INJECTION, SOLUTION INTRAVENOUS; SUBCUTANEOUS at 21:52

## 2020-01-01 RX ADMIN — PANTOPRAZOLE SODIUM 80 MG: 40 INJECTION, POWDER, FOR SOLUTION INTRAVENOUS at 08:21

## 2020-01-01 RX ADMIN — Medication 10 ML: at 20:26

## 2020-01-01 RX ADMIN — INSULIN LISPRO 6 UNITS: 100 INJECTION, SOLUTION INTRAVENOUS; SUBCUTANEOUS at 12:15

## 2020-01-01 RX ADMIN — INSULIN LISPRO 4 UNITS: 100 INJECTION, SOLUTION INTRAVENOUS; SUBCUTANEOUS at 20:27

## 2020-01-01 RX ADMIN — Medication 10 ML: at 22:33

## 2020-01-01 RX ADMIN — REMDESIVIR 100 MG: 100 INJECTION, POWDER, LYOPHILIZED, FOR SOLUTION INTRAVENOUS at 15:34

## 2020-01-01 RX ADMIN — ENOXAPARIN SODIUM 40 MG: 40 INJECTION SUBCUTANEOUS at 08:52

## 2020-01-01 RX ADMIN — TAMSULOSIN HYDROCHLORIDE 0.4 MG: 0.4 CAPSULE ORAL at 09:00

## 2020-01-01 RX ADMIN — ENOXAPARIN SODIUM 40 MG: 40 INJECTION SUBCUTANEOUS at 09:18

## 2020-01-01 RX ADMIN — Medication 10 ML: at 21:23

## 2020-01-01 RX ADMIN — TAMSULOSIN HYDROCHLORIDE 0.4 MG: 0.4 CAPSULE ORAL at 08:20

## 2020-01-01 RX ADMIN — OXYCODONE HYDROCHLORIDE AND ACETAMINOPHEN 500 MG: 500 TABLET ORAL at 07:53

## 2020-01-01 RX ADMIN — INSULIN LISPRO 2 UNITS: 100 INJECTION, SOLUTION INTRAVENOUS; SUBCUTANEOUS at 21:36

## 2020-01-01 RX ADMIN — INSULIN LISPRO 2 UNITS: 100 INJECTION, SOLUTION INTRAVENOUS; SUBCUTANEOUS at 12:31

## 2020-01-01 RX ADMIN — Medication 10 ML: at 09:19

## 2020-01-01 RX ADMIN — DEXAMETHASONE SODIUM PHOSPHATE 14 MG: 4 INJECTION, SOLUTION INTRA-ARTICULAR; INTRALESIONAL; INTRAMUSCULAR; INTRAVENOUS; SOFT TISSUE at 12:20

## 2020-01-01 RX ADMIN — OXYCODONE HYDROCHLORIDE AND ACETAMINOPHEN 500 MG: 500 TABLET ORAL at 09:47

## 2020-01-01 RX ADMIN — DILTIAZEM HYDROCHLORIDE 10 MG: 5 INJECTION INTRAVENOUS at 01:30

## 2020-01-01 RX ADMIN — Medication 10 ML: at 09:09

## 2020-01-01 RX ADMIN — REMDESIVIR 100 MG: 100 INJECTION, POWDER, LYOPHILIZED, FOR SOLUTION INTRAVENOUS at 13:22

## 2020-01-01 RX ADMIN — INSULIN GLARGINE 10 UNITS: 100 INJECTION, SOLUTION SUBCUTANEOUS at 21:52

## 2020-01-01 RX ADMIN — OXYCODONE HYDROCHLORIDE AND ACETAMINOPHEN 500 MG: 500 TABLET ORAL at 08:05

## 2020-01-01 RX ADMIN — INSULIN LISPRO 2 UNITS: 100 INJECTION, SOLUTION INTRAVENOUS; SUBCUTANEOUS at 08:57

## 2020-01-01 RX ADMIN — Medication 50 MG: at 07:53

## 2020-01-01 RX ADMIN — DEXAMETHASONE SODIUM PHOSPHATE 20 MG: 4 INJECTION, SOLUTION INTRA-ARTICULAR; INTRALESIONAL; INTRAMUSCULAR; INTRAVENOUS; SOFT TISSUE at 12:14

## 2020-01-01 RX ADMIN — SODIUM CHLORIDE 20 ML: 9 INJECTION, SOLUTION INTRAVENOUS at 00:50

## 2020-01-01 RX ADMIN — ENOXAPARIN SODIUM 120 MG: 120 INJECTION SUBCUTANEOUS at 20:10

## 2020-01-01 RX ADMIN — Medication 50 MG: at 08:52

## 2020-01-01 RX ADMIN — INSULIN LISPRO 2 UNITS: 100 INJECTION, SOLUTION INTRAVENOUS; SUBCUTANEOUS at 17:33

## 2020-01-01 RX ADMIN — OXYCODONE HYDROCHLORIDE AND ACETAMINOPHEN 500 MG: 500 TABLET ORAL at 08:19

## 2020-01-01 RX ADMIN — LISINOPRIL 10 MG: 10 TABLET ORAL at 08:19

## 2020-01-01 RX ADMIN — ENOXAPARIN SODIUM 40 MG: 40 INJECTION SUBCUTANEOUS at 07:54

## 2020-01-01 RX ADMIN — ENOXAPARIN SODIUM 120 MG: 120 INJECTION SUBCUTANEOUS at 09:48

## 2020-01-01 RX ADMIN — INSULIN LISPRO 2 UNITS: 100 INJECTION, SOLUTION INTRAVENOUS; SUBCUTANEOUS at 07:55

## 2020-01-01 RX ADMIN — ENOXAPARIN SODIUM 120 MG: 120 INJECTION SUBCUTANEOUS at 20:47

## 2020-01-01 RX ADMIN — Medication 10 ML: at 23:07

## 2020-01-01 RX ADMIN — INSULIN LISPRO 6 UNITS: 100 INJECTION, SOLUTION INTRAVENOUS; SUBCUTANEOUS at 17:32

## 2020-01-01 RX ADMIN — ATORVASTATIN CALCIUM 40 MG: 40 TABLET, FILM COATED ORAL at 20:48

## 2020-01-01 RX ADMIN — LISINOPRIL 10 MG: 10 TABLET ORAL at 08:56

## 2020-01-01 RX ADMIN — PHENYLEPHRINE HYDROCHLORIDE 50 MCG/MIN: 10 INJECTION INTRAVENOUS at 08:54

## 2020-01-01 RX ADMIN — Medication 50 MG: at 09:47

## 2020-01-01 RX ADMIN — TAMSULOSIN HYDROCHLORIDE 0.4 MG: 0.4 CAPSULE ORAL at 09:48

## 2020-01-01 RX ADMIN — INSULIN LISPRO 6 UNITS: 100 INJECTION, SOLUTION INTRAVENOUS; SUBCUTANEOUS at 10:04

## 2020-01-01 RX ADMIN — INSULIN GLARGINE 30 UNITS: 100 INJECTION, SOLUTION SUBCUTANEOUS at 20:13

## 2020-01-01 RX ADMIN — ENOXAPARIN SODIUM 40 MG: 40 INJECTION SUBCUTANEOUS at 08:56

## 2020-01-01 RX ADMIN — ATORVASTATIN CALCIUM 40 MG: 40 TABLET, FILM COATED ORAL at 21:23

## 2020-01-01 RX ADMIN — ENOXAPARIN SODIUM 40 MG: 40 INJECTION SUBCUTANEOUS at 21:13

## 2020-01-01 RX ADMIN — INSULIN LISPRO 8 UNITS: 100 INJECTION, SOLUTION INTRAVENOUS; SUBCUTANEOUS at 17:17

## 2020-01-01 RX ADMIN — AZITHROMYCIN MONOHYDRATE 500 MG: 500 INJECTION, POWDER, LYOPHILIZED, FOR SOLUTION INTRAVENOUS at 12:59

## 2020-01-01 RX ADMIN — METOPROLOL TARTRATE 25 MG: 25 TABLET, FILM COATED ORAL at 09:47

## 2020-01-01 RX ADMIN — SODIUM CHLORIDE 20 ML: 9 INJECTION, SOLUTION INTRAVENOUS at 11:02

## 2020-01-01 RX ADMIN — ALOGLIPTIN 25 MG: 6.25 TABLET, FILM COATED ORAL at 22:17

## 2020-01-01 RX ADMIN — INSULIN LISPRO 5 UNITS: 100 INJECTION, SOLUTION INTRAVENOUS; SUBCUTANEOUS at 20:14

## 2020-01-01 RX ADMIN — INSULIN LISPRO 4 UNITS: 100 INJECTION, SOLUTION INTRAVENOUS; SUBCUTANEOUS at 13:13

## 2020-01-01 RX ADMIN — Medication 10 ML: at 08:20

## 2020-01-01 RX ADMIN — ENOXAPARIN SODIUM 40 MG: 40 INJECTION SUBCUTANEOUS at 07:52

## 2020-01-01 RX ADMIN — INSULIN LISPRO 2 UNITS: 100 INJECTION, SOLUTION INTRAVENOUS; SUBCUTANEOUS at 18:34

## 2020-01-01 RX ADMIN — VASOPRESSIN 0.04 UNITS/MIN: 20 INJECTION INTRAVENOUS at 09:00

## 2020-01-01 RX ADMIN — OXYCODONE HYDROCHLORIDE AND ACETAMINOPHEN 500 MG: 500 TABLET ORAL at 21:14

## 2020-01-01 RX ADMIN — INSULIN LISPRO 4 UNITS: 100 INJECTION, SOLUTION INTRAVENOUS; SUBCUTANEOUS at 13:50

## 2020-01-01 RX ADMIN — CHOLECALCIFEROL TAB 25 MCG (1000 UNIT) 1000 UNITS: 25 TAB at 09:47

## 2020-01-01 RX ADMIN — ENOXAPARIN SODIUM 40 MG: 40 INJECTION SUBCUTANEOUS at 08:05

## 2020-01-01 RX ADMIN — ENOXAPARIN SODIUM 40 MG: 40 INJECTION SUBCUTANEOUS at 21:23

## 2020-01-01 RX ADMIN — INSULIN LISPRO 4 UNITS: 100 INJECTION, SOLUTION INTRAVENOUS; SUBCUTANEOUS at 08:07

## 2020-01-01 RX ADMIN — LISINOPRIL 10 MG: 10 TABLET ORAL at 22:18

## 2020-01-01 RX ADMIN — INSULIN GLARGINE 10 UNITS: 100 INJECTION, SOLUTION SUBCUTANEOUS at 21:14

## 2020-01-01 RX ADMIN — ATORVASTATIN CALCIUM 40 MG: 40 TABLET, FILM COATED ORAL at 20:23

## 2020-01-01 RX ADMIN — INSULIN LISPRO 4 UNITS: 100 INJECTION, SOLUTION INTRAVENOUS; SUBCUTANEOUS at 09:52

## 2020-01-01 RX ADMIN — Medication 10 ML: at 09:08

## 2020-01-01 RX ADMIN — INSULIN GLARGINE 30 UNITS: 100 INJECTION, SOLUTION SUBCUTANEOUS at 09:52

## 2020-01-01 RX ADMIN — ENOXAPARIN SODIUM 120 MG: 120 INJECTION SUBCUTANEOUS at 08:20

## 2020-01-01 RX ADMIN — LISINOPRIL 10 MG: 10 TABLET ORAL at 07:54

## 2020-01-01 RX ADMIN — ATORVASTATIN CALCIUM 40 MG: 40 TABLET, FILM COATED ORAL at 20:16

## 2020-01-01 RX ADMIN — IPRATROPIUM BROMIDE AND ALBUTEROL SULFATE 1 AMPULE: .5; 3 SOLUTION RESPIRATORY (INHALATION) at 12:45

## 2020-01-01 RX ADMIN — Medication 10 ML: at 10:06

## 2020-01-01 RX ADMIN — Medication 10 ML: at 09:18

## 2020-01-01 RX ADMIN — INSULIN LISPRO 4 UNITS: 100 INJECTION, SOLUTION INTRAVENOUS; SUBCUTANEOUS at 12:46

## 2020-01-01 RX ADMIN — INSULIN LISPRO 2 UNITS: 100 INJECTION, SOLUTION INTRAVENOUS; SUBCUTANEOUS at 21:14

## 2020-01-01 RX ADMIN — INSULIN LISPRO 2 UNITS: 100 INJECTION, SOLUTION INTRAVENOUS; SUBCUTANEOUS at 12:14

## 2020-01-01 RX ADMIN — Medication 10 ML: at 21:37

## 2020-01-01 RX ADMIN — DEXAMETHASONE SODIUM PHOSPHATE 20 MG: 4 INJECTION, SOLUTION INTRA-ARTICULAR; INTRALESIONAL; INTRAMUSCULAR; INTRAVENOUS; SOFT TISSUE at 13:10

## 2020-01-01 RX ADMIN — DEXAMETHASONE SODIUM PHOSPHATE 20 MG: 4 INJECTION, SOLUTION INTRA-ARTICULAR; INTRALESIONAL; INTRAMUSCULAR; INTRAVENOUS; SOFT TISSUE at 13:22

## 2020-01-01 RX ADMIN — Medication 10 ML: at 23:08

## 2020-01-01 RX ADMIN — INSULIN GLARGINE 10 UNITS: 100 INJECTION, SOLUTION SUBCUTANEOUS at 21:36

## 2020-01-01 RX ADMIN — INSULIN GLARGINE 30 UNITS: 100 INJECTION, SOLUTION SUBCUTANEOUS at 10:04

## 2020-01-01 RX ADMIN — INSULIN LISPRO 2 UNITS: 100 INJECTION, SOLUTION INTRAVENOUS; SUBCUTANEOUS at 17:46

## 2020-01-01 RX ADMIN — ENOXAPARIN SODIUM 40 MG: 40 INJECTION SUBCUTANEOUS at 20:23

## 2020-01-01 RX ADMIN — Medication 10 ML: at 20:48

## 2020-01-01 RX ADMIN — ATORVASTATIN CALCIUM 40 MG: 40 TABLET, FILM COATED ORAL at 21:52

## 2020-01-01 RX ADMIN — SODIUM CHLORIDE: 9 INJECTION, SOLUTION INTRAVENOUS at 22:17

## 2020-01-01 RX ADMIN — ENOXAPARIN SODIUM 120 MG: 120 INJECTION SUBCUTANEOUS at 20:16

## 2020-01-01 RX ADMIN — INSULIN LISPRO 6 UNITS: 100 INJECTION, SOLUTION INTRAVENOUS; SUBCUTANEOUS at 21:26

## 2020-01-01 RX ADMIN — DEXAMETHASONE 4 MG: 4 TABLET ORAL at 22:18

## 2020-01-01 RX ADMIN — REMDESIVIR 100 MG: 100 INJECTION, POWDER, LYOPHILIZED, FOR SOLUTION INTRAVENOUS at 15:25

## 2020-01-01 RX ADMIN — ATORVASTATIN CALCIUM 40 MG: 40 TABLET, FILM COATED ORAL at 21:14

## 2020-01-01 RX ADMIN — TAMSULOSIN HYDROCHLORIDE 0.4 MG: 0.4 CAPSULE ORAL at 09:16

## 2020-01-01 RX ADMIN — Medication 50 MG: at 08:19

## 2020-01-01 RX ADMIN — Medication 10 ML: at 07:54

## 2020-01-01 RX ADMIN — INSULIN LISPRO 4 UNITS: 100 INJECTION, SOLUTION INTRAVENOUS; SUBCUTANEOUS at 18:24

## 2020-01-01 RX ADMIN — CHOLECALCIFEROL TAB 25 MCG (1000 UNIT) 1000 UNITS: 25 TAB at 07:52

## 2020-01-01 RX ADMIN — DEXAMETHASONE SODIUM PHOSPHATE 10 MG: 10 INJECTION, SOLUTION INTRAMUSCULAR; INTRAVENOUS at 12:51

## 2020-01-01 RX ADMIN — Medication 10 ML: at 02:27

## 2020-01-01 RX ADMIN — TAMSULOSIN HYDROCHLORIDE 0.4 MG: 0.4 CAPSULE ORAL at 07:54

## 2020-01-01 RX ADMIN — Medication 10 ML: at 22:19

## 2020-01-01 RX ADMIN — INSULIN LISPRO 4 UNITS: 100 INJECTION, SOLUTION INTRAVENOUS; SUBCUTANEOUS at 20:48

## 2020-01-01 RX ADMIN — Medication 10 ML: at 09:00

## 2020-01-01 RX ADMIN — LISINOPRIL 10 MG: 10 TABLET ORAL at 09:16

## 2020-01-01 RX ADMIN — Medication 10 ML: at 10:07

## 2020-01-01 RX ADMIN — INSULIN LISPRO 6 UNITS: 100 INJECTION, SOLUTION INTRAVENOUS; SUBCUTANEOUS at 07:53

## 2020-01-01 RX ADMIN — LISINOPRIL 10 MG: 10 TABLET ORAL at 09:47

## 2020-01-01 RX ADMIN — Medication 70 MCG/MIN: at 08:22

## 2020-01-01 RX ADMIN — PIOGLITAZONE 15 MG: 15 TABLET ORAL at 22:17

## 2020-01-01 RX ADMIN — PIOGLITAZONE 15 MG: 15 TABLET ORAL at 09:16

## 2020-01-01 RX ADMIN — ATORVASTATIN CALCIUM 40 MG: 40 TABLET, FILM COATED ORAL at 21:35

## 2020-01-01 RX ADMIN — SODIUM CHLORIDE 20 ML: 9 INJECTION, SOLUTION INTRAVENOUS at 11:01

## 2020-01-01 RX ADMIN — TAMSULOSIN HYDROCHLORIDE 0.4 MG: 0.4 CAPSULE ORAL at 07:53

## 2020-01-01 RX ADMIN — ENOXAPARIN SODIUM 40 MG: 40 INJECTION SUBCUTANEOUS at 21:36

## 2020-01-01 RX ADMIN — CHOLECALCIFEROL TAB 25 MCG (1000 UNIT) 1000 UNITS: 25 TAB at 08:19

## 2020-01-01 RX ADMIN — ENOXAPARIN SODIUM 40 MG: 40 INJECTION SUBCUTANEOUS at 21:52

## 2020-01-01 RX ADMIN — LISINOPRIL 10 MG: 10 TABLET ORAL at 07:59

## 2020-01-01 RX ADMIN — DEXAMETHASONE SODIUM PHOSPHATE 20 MG: 4 INJECTION, SOLUTION INTRA-ARTICULAR; INTRALESIONAL; INTRAMUSCULAR; INTRAVENOUS; SOFT TISSUE at 10:30

## 2020-01-01 RX ADMIN — TAMSULOSIN HYDROCHLORIDE 0.4 MG: 0.4 CAPSULE ORAL at 08:55

## 2020-01-01 RX ADMIN — Medication 10 ML: at 20:23

## 2020-01-01 RX ADMIN — DEXAMETHASONE SODIUM PHOSPHATE 20 MG: 4 INJECTION, SOLUTION INTRA-ARTICULAR; INTRALESIONAL; INTRAMUSCULAR; INTRAVENOUS; SOFT TISSUE at 12:15

## 2020-01-01 RX ADMIN — Medication 1 G: at 12:55

## 2020-01-01 RX ADMIN — INSULIN LISPRO 6 UNITS: 100 INJECTION, SOLUTION INTRAVENOUS; SUBCUTANEOUS at 13:21

## 2020-01-01 RX ADMIN — INSULIN GLARGINE 30 UNITS: 100 INJECTION, SOLUTION SUBCUTANEOUS at 08:37

## 2020-01-01 RX ADMIN — INSULIN GLARGINE 30 UNITS: 100 INJECTION, SOLUTION SUBCUTANEOUS at 20:48

## 2020-01-01 RX ADMIN — REMDESIVIR 200 MG: 100 INJECTION, POWDER, LYOPHILIZED, FOR SOLUTION INTRAVENOUS at 14:32

## 2020-01-01 RX ADMIN — CHOLECALCIFEROL TAB 25 MCG (1000 UNIT) 1000 UNITS: 25 TAB at 08:52

## 2020-01-01 RX ADMIN — ATORVASTATIN CALCIUM 40 MG: 40 TABLET, FILM COATED ORAL at 20:11

## 2020-01-01 RX ADMIN — Medication 10 ML: at 08:07

## 2020-01-01 RX ADMIN — PROTAMINE SULFATE 50 MG: 10 INJECTION, SOLUTION INTRAVENOUS at 07:52

## 2020-01-01 RX ADMIN — INSULIN LISPRO 6 UNITS: 100 INJECTION, SOLUTION INTRAVENOUS; SUBCUTANEOUS at 16:59

## 2020-01-01 RX ADMIN — INSULIN GLARGINE 30 UNITS: 100 INJECTION, SOLUTION SUBCUTANEOUS at 20:15

## 2020-01-01 RX ADMIN — DEXAMETHASONE SODIUM PHOSPHATE 10 MG: 4 INJECTION, SOLUTION INTRAMUSCULAR; INTRAVENOUS at 10:06

## 2020-01-01 RX ADMIN — LISINOPRIL 10 MG: 10 TABLET ORAL at 07:53

## 2020-01-01 RX ADMIN — PANTOPRAZOLE SODIUM 8 MG/HR: 40 INJECTION, POWDER, FOR SOLUTION INTRAVENOUS at 08:46

## 2020-01-01 RX ADMIN — METOPROLOL TARTRATE 25 MG: 25 TABLET, FILM COATED ORAL at 20:11

## 2020-01-01 RX ADMIN — Medication 50 MG: at 08:05

## 2020-01-01 RX ADMIN — TAMSULOSIN HYDROCHLORIDE 0.4 MG: 0.4 CAPSULE ORAL at 22:18

## 2020-01-01 RX ADMIN — INSULIN LISPRO 4 UNITS: 100 INJECTION, SOLUTION INTRAVENOUS; SUBCUTANEOUS at 20:12

## 2020-01-01 RX ADMIN — DEXAMETHASONE 6 MG: 4 TABLET ORAL at 09:16

## 2020-01-01 RX ADMIN — INSULIN LISPRO 2 UNITS: 100 INJECTION, SOLUTION INTRAVENOUS; SUBCUTANEOUS at 08:37

## 2020-01-01 RX ADMIN — REMDESIVIR 100 MG: 100 INJECTION, POWDER, LYOPHILIZED, FOR SOLUTION INTRAVENOUS at 13:53

## 2020-01-01 RX ADMIN — Medication 10 ML: at 08:53

## 2020-01-01 RX ADMIN — CHOLECALCIFEROL TAB 25 MCG (1000 UNIT) 1000 UNITS: 25 TAB at 08:05

## 2020-01-01 RX ADMIN — ENOXAPARIN SODIUM 40 MG: 40 INJECTION SUBCUTANEOUS at 22:17

## 2020-01-01 RX ADMIN — ATORVASTATIN CALCIUM 40 MG: 40 TABLET, FILM COATED ORAL at 22:18

## 2020-01-01 RX ADMIN — INSULIN LISPRO 2 UNITS: 100 INJECTION, SOLUTION INTRAVENOUS; SUBCUTANEOUS at 12:52

## 2020-01-01 RX ADMIN — METFORMIN HYDROCHLORIDE 850 MG: 850 TABLET ORAL at 22:17

## 2020-01-01 RX ADMIN — OXYCODONE HYDROCHLORIDE AND ACETAMINOPHEN 500 MG: 500 TABLET ORAL at 08:52

## 2020-01-01 RX ADMIN — Medication 50 MG: at 21:14

## 2020-01-01 RX ADMIN — CHOLECALCIFEROL TAB 25 MCG (1000 UNIT) 1000 UNITS: 25 TAB at 21:14

## 2020-01-01 RX ADMIN — DEXAMETHASONE SODIUM PHOSPHATE 20 MG: 4 INJECTION, SOLUTION INTRA-ARTICULAR; INTRALESIONAL; INTRAMUSCULAR; INTRAVENOUS; SOFT TISSUE at 13:51

## 2020-01-01 ASSESSMENT — ENCOUNTER SYMPTOMS
DIARRHEA: 0
EYE PAIN: 0
SORE THROAT: 0
NAUSEA: 0
SHORTNESS OF BREATH: 1
CONSTIPATION: 0
COUGH: 1
RHINORRHEA: 1
BACK PAIN: 0
ABDOMINAL PAIN: 0
VOMITING: 0

## 2020-01-01 ASSESSMENT — PAIN SCALES - GENERAL
PAINLEVEL_OUTOF10: 0
PAINLEVEL_OUTOF10: 8
PAINLEVEL_OUTOF10: 0

## 2020-01-01 ASSESSMENT — PULMONARY FUNCTION TESTS
PIF_VALUE: 23
PIF_VALUE: 22

## 2020-12-05 PROBLEM — J96.01 ACUTE RESPIRATORY FAILURE WITH HYPOXIA (HCC): Status: ACTIVE | Noted: 2020-01-01

## 2020-12-05 PROBLEM — J96.21 ACUTE ON CHRONIC RESPIRATORY FAILURE WITH HYPOXEMIA (HCC): Status: ACTIVE | Noted: 2020-01-01

## 2020-12-05 PROBLEM — J12.82 PNEUMONIA DUE TO COVID-19 VIRUS: Status: ACTIVE | Noted: 2020-01-01

## 2020-12-05 PROBLEM — U07.1 PNEUMONIA DUE TO COVID-19 VIRUS: Status: ACTIVE | Noted: 2020-01-01

## 2020-12-05 NOTE — ED NOTES
Pt found to be 79% on RA upon triage, immediately placed on 6 LPM via NC of oxygen, pt oxygen improved to 86% and pt was moved to room 22 and placed on high flow oxygen at 10 LPM.     Jania Cordero RN  12/05/20 1110

## 2020-12-05 NOTE — ED NOTES
Pt medicated per orders. Updated on plan of care. Meal tray ordered for patient. Patient resting comfortably with no signs of distress. Denies any needs at this time. Bed locked and in lowest position with both side rails raised. Call light within reach.      Domingo Trejo RN  12/05/20 4409

## 2020-12-05 NOTE — ED NOTES
Pt seen on monitor by Bertha gomez in NSR. Pt name and box number confirmed. Pt alert and oriented and shows no signs of distress at time of transfer to [address] [place] [address]. Pt taken upstairs via bed by kobe azar. Pt on 15L at time of transfer. Norman Cortez RN  12/05/20 1630

## 2020-12-05 NOTE — ED PROVIDER NOTES
waves.    Exam:    Well-nourished male getting undressed in no acute distress. His chest showed some decreased breath sounds in the lower lobes. He had no contusions or obvious deformities of his chest wall area. He did have some pain in the patient over his right lateral chest wall area. Medical decision makin-year-old male presents with increasing shortness of breath and oxygen demand. The patient was hypoxic upon arrival and is requiring oxygen. His chest x-ray is consistent with Covid pneumonia. The patient admitted for further care. Critical care time: 45 minutes of critical care time spent with this patient. FINAL IMPRESSION:    1. Pneumonia due to organism    2. COVID-19    3.  Hypoxemia           Edvin Estrella MD  [phone]

## 2020-12-05 NOTE — ED NOTES
Iv inserted. Blood collected. Xray at bedside. Dr George Julien and catarino golden at bedside.      Sravan Nick, RN  12/05/20 4890

## 2020-12-05 NOTE — ED NOTES
Patient resting comfortably with no signs of distress. Denies any needs at this time. Bed locked and in lowest position with both side rails raised. Call light within reach.      José Miguel Bond RN  12/05/20 8844

## 2020-12-05 NOTE — ED PROVIDER NOTES
[facility]        Pt Name: Anita Quesada  MRN: [id number]  [place] 1940  Date of evaluation: 12/5/2020  Provider: Rambo Art PA-C  PCP: Sp Krishna MD     I have seen and evaluated this patient with my supervising physician Eliseo Garibay MD.        [address] [place] [address]       Chief Complaint   Patient presents with    Shortness of Breath     Syncope Thursday in shower, broke ribs on right side, Covid test positive 12/3, now with increased SOB       HISTORY OF PRESENT ILLNESS   (Location/Symptom, Timing/Onset, Context/Setting, Quality, Duration, Modifying Factors, Severity)  Note limiting factors. Anita Quesada is a 78 y.o. male who presents here to the emergency department, the patient states that on Thursday, he fell in the shower, and landed on his right side. Although his chest x-ray was unremarkable the assumption is that he broke some ribs. He saw his family doctor who ordered a Covid test and it was positive. He has continued to have shortness of breath, and today has had worsening oxygen saturation rate, decreased appetite, decreased smell and taste, congestion, runny nose generally not feeling well. He denies any abdominal pain. Nursing Notes were all reviewed and agreed with or any disagreements were addressed  in the HPI. REVIEW OF SYSTEMS    (2-9 systems for level 4, 10 or more for level 5)     Review of Systems   Constitutional: Negative for chills, diaphoresis and fever. HENT: Positive for congestion and rhinorrhea. Negative for ear pain and sore throat. Eyes: Negative for pain and visual disturbance. Respiratory: Positive for cough and shortness of breath. Cardiovascular: Positive for chest pain. Negative for palpitations and leg swelling. Gastrointestinal: Negative for abdominal pain, constipation, diarrhea, nausea and vomiting.    Genitourinary: Negative for decreased urine volume, dysuria, frequency and urgency. Musculoskeletal: Negative for back pain and neck pain. Skin: Negative for rash and wound. Neurological: Negative for dizziness and light-headedness. PAST MEDICAL HISTORY   History reviewed. No pertinent past medical history. SURGICAL HISTORY   History reviewed. No pertinent surgical history. CURRENTMEDICATIONS       Previous Medications    No medications on file       ALLERGIES     Patient has no known allergies. FAMILYHISTORY     History reviewed. No pertinent family history. SOCIAL HISTORY       Social History     Socioeconomic History    Marital status:       Spouse name: None    Number of children: None    Years of education: None    Highest education level: None   Occupational History    None   Social Needs    Financial resource strain: None    Food insecurity     Worry: None     Inability: None    Transportation needs     Medical: None     Non-medical: None   Tobacco Use    Smoking status: Former Smoker    Smokeless tobacco: Never Used    Tobacco comment: 40 years quit   Substance and Sexual Activity    Alcohol use: Not Currently    Drug use: Never    Sexual activity: None   Lifestyle    Physical activity     Days per week: None     Minutes per session: None    Stress: None   Relationships    Social connections     Talks on phone: None     Gets together: None     Attends Nondenominational service: None     Active member of club or organization: None     Attends meetings of clubs or organizations: None     Relationship status: None    Intimate partner violence     Fear of current or ex partner: None     Emotionally abused: None     Physically abused: None     Forced sexual activity: None   Other Topics Concern    None   Social History Narrative    None       SCREENINGS             PHYSICAL EXAM    (up to 7 for level 4, 8 or more for level 5)     ED Triage Vitals   BP Temp Temp Source Pulse Resp SpO2 Height Weight   12/05/20 1051 12/05/20 1051 12/05/20 1051 12/05/20 1051 12/05/20 1051 12/05/20 1051 12/05/20 1258 12/05/20 1258   117/75 97.8 °F (36.6 °C) Oral 102 18 (!) 79 % 6' (1.829 m) 267 lb (121.1 kg)       Physical Exam  Vitals signs and nursing note reviewed. Constitutional:       Appearance: Normal appearance. He is well-developed. He is not diaphoretic. HENT:      Head: Normocephalic and atraumatic. Right Ear: External ear normal.      Left Ear: External ear normal.      Nose: Nose normal.   Eyes:      General:         Right eye: No discharge. Left eye: No discharge. Neck:      Musculoskeletal: Normal range of motion and neck supple. Cardiovascular:      Rate and Rhythm: Regular rhythm. Tachycardia present. Heart sounds: Normal heart sounds. No murmur. No friction rub. No gallop. Pulmonary:      Effort: Respiratory distress present. Breath sounds: No stridor. Wheezing and rhonchi present. No rales. Chest:      Chest wall: Tenderness present. Abdominal:      General: Bowel sounds are normal. There is no distension or abdominal bruit. Palpations: Abdomen is soft. Abdomen is not rigid. There is no mass or pulsatile mass. Tenderness: There is no abdominal tenderness. There is no guarding or rebound. Negative signs include Pool's sign and McBurney's sign. Musculoskeletal: Normal range of motion. Skin:     General: Skin is warm and dry. Coloration: Skin is not pale. Neurological:      Mental Status: He is alert and oriented to person, place, and time.    Psychiatric:         Behavior: Behavior normal.         DIAGNOSTIC RESULTS   LABS:    Labs Reviewed   CBC WITH AUTO DIFFERENTIAL - Abnormal; Notable for the following components:       Result Value    MPV 11.0 (*)     Neutrophils Absolute 9.4 (*)     Lymphocytes Absolute 0.5 (*)     All other components within normal limits    Narrative:     Performed at:  [facility]  [address] [place] [address]   Phone [phone]   COMPREHENSIVE METABOLIC PANEL W/ REFLEX TO MG FOR LOW K - Abnormal; Notable for the following components:    CO2 17 (*)     Glucose 236 (*)     BUN 39 (*)     ALT 51 (*)     AST 48 (*)     All other components within normal limits    Narrative:     Performed at:  [facility]  [address] [facility],  [place], [address]   Phone [phone]   CULTURE, BLOOD 1   CULTURE, BLOOD 2   TROPONIN    Narrative:     Performed at:  [facility]  [address] [facility],  [place], [address]   Phone [phone] PEPTIDE    Narrative:     Performed at:  [facility]  [address] [facility],  [place], [address]   Phone [phone]       All other labs were within normal range or not returned as of this dictation. EKG: All EKG's are interpreted by the Emergency Department Physician who either signs orCo-signs this chart in the absence of a cardiologist.  Please see their note for interpretation of EKG. RADIOLOGY:   Non-plain film images such as CT, Ultrasound and MRI are read by the radiologist. Nelma Drop radiographic images are visualized andpreliminarily interpreted by the  ED Provider with the below findings:        Interpretation perthe Radiologist below, if available at the time of this note:    XR CHEST PORTABLE   Final Result   Bibasilar opacities could represent subsegmental atelectasis, bacterial or   atypical pneumonia           Xr Chest Portable    Result Date: 12/5/2020  EXAMINATION: ONE XRAY VIEW OF THE CHEST 12/5/2020 11:22 am COMPARISON: None.  HISTORY: ORDERING SYSTEM PROVIDED HISTORY: Chest Discomfort TECHNOLOGIST PROVIDED HISTORY: Reason for exam:->Chest Discomfort Reason for Exam: Shortness of Breath (Syncope Thursday in shower, broke ribs on right side, Covid test positive 12/3, now with increased SOB) Acuity: Acute Type of Exam: Initial FINDINGS: Heart size at the upper limits Pneumonia due to organism    2. COVID-19    3. Hypoxemia          DISPOSITION/PLAN   DISPOSITION Admitted 12/05/2020 02:29:58 PM      PATIENT REFERREDTO:  No follow-up provider specified.     DISCHARGE MEDICATIONS:  New Prescriptions    No medications on file       DISCONTINUED MEDICATIONS:  Discontinued Medications    No medications on file              (Please note that portions ofthis note were completed with a voice recognition program.  Efforts were made to edit the dictations but occasionally words are mis-transcribed.)    Roxy Horowitz PA-C (electronically signed)             Roxy Horowitz PA-C  12/05/20 8768

## 2020-12-06 NOTE — H&P
Department of Internal Medicine  History & Physical      SUBJECTIVE:  The patient is an 78year old with history of DM,HTN and hypercholesterolemia, the patient was diagnosed with covid on Thursday after falling at home on the right side of the chest, he has been having chest pain from the fall, he has been monitoring oxygen and dropped to the upper 70 , he was instructed to come to the ER, denies any other c/u , currently he is on 15 L of oxygen    ALLERGIES: No Known Allergies   MEDICATIONS:   Prior to Admission medications    Medication Sig Start Date End Date Taking? Authorizing Provider   SITagliptin (JANUVIA) 100 MG tablet Take 100 mg by mouth daily   Yes Historical Provider, MD   atorvastatin (LIPITOR) 40 MG tablet Take 40 mg by mouth daily   Yes Historical Provider, MD   lisinopril (PRINIVIL;ZESTRIL) 10 MG tablet Take 10 mg by mouth daily   Yes Historical Provider, MD   meloxicam (MOBIC) 15 MG tablet Take 15 mg by mouth daily   Yes Historical Provider, MD   pioglitazone (ACTOS) 15 MG tablet Take 15 mg by mouth 2 times daily   Yes Historical Provider, MD   metFORMIN (GLUCOPHAGE) 850 MG tablet Take 850 mg by mouth 2 times daily (with meals)   Yes Historical Provider, MD   tamsulosin (FLOMAX) 0.4 MG capsule Take 0.4 mg by mouth 2 times daily   Yes Historical Provider, MD   aspirin 81 MG chewable tablet Take 81 mg by mouth daily   Yes Historical Provider, MD     PMH History reviewed. No pertinent past medical history. PSH: History reviewed. No pertinent surgical history. FAMILY HISTORY: History reviewed. No pertinent family history.    SOCIAL HISTORY:   Social History     Tobacco Use    Smoking status: Former Smoker    Smokeless tobacco: Never Used    Tobacco comment: 40 years quit   Substance Use Topics    Alcohol use: Not Currently        REVIEW OF SYSTEMS: -   General ROS:denies any headache or weakness  Ophthalmic ROS: denies any blurred vision, double vision or vision loss  ENT ROS: denies any epistaxis, nasal discharge  Hematological and Lymphatic: denies any fatigue, night sweats, enlarge lymph nodes or bruising ,   Respiratory ROS: had shortness of breath, dyspnea on exertion,denies  wheezing, or cough   Cardiovascular ROS: denies any chest pain, palpitations, shortness of breath, dizziness syncope or swelling in extremities  Gastrointestinal ROS: denies any abdominal pain, acid reflux, change in bowel habits or blood in stool, dark or tarry stools     Musculoskeletal ROS:denies any back pain or joint pain,  Neurological ROS: denies any mental status changes, seizures, memory loss, speech problems or muscle weakness in extremities   Dermatological ROS: denies any rash or skin lesions     OBJECTIVE:      PHYSICAL:   VITALS:  /70   Pulse 77   Temp 97.5 °F (36.4 °C) (Oral)   Resp 18   Ht 6' (1.829 m)   Wt 265 lb 10.5 oz (120.5 kg)   SpO2 90%   BMI 36.03 kg/m²   PULSE OXIMETRY RANGE: SpO2  Av.8 %  Min: 79 %  Max: 94 %    Intake/Output Summary (Last 24 hours) at 2020 0902  Last data filed at 2020 0600  Gross per 24 hour   Intake 247 ml   Output 400 ml   Net -153 ml      CONSTITUTIONAL:  awake, alert, cooperative, no apparent distress, and appears stated age  HEAD:  Normocephalic, atraumatic  HEENT:  ENT exam normal, no neck nodes or sinus tenderness  EYE: conjunctivae/corneas clear. PERRL, EOM's intact. Fundi benign. NECK:  Supple, symmetrical, trachea midline, no adenopathy, thyroid symmetric, not enlarged and no tenderness, skin normal  LUNGS:  No increased work of breathing, good air exchange, clear to auscultation bilaterally, no crackles or wheezing  CARDIOVASCULAR:  regular rate and rhythm  ABDOMEN:  No scars, normal bowel sounds, soft, non-distended, non-tender, no masses palpated, no hepatosplenomegally  MUSCULOSKELETAL:  There is no redness, warmth, or swelling of the joints. Full range of motion noted. Motor strength is 5 out of 5 all extremities bilaterally.   Tone is normal.  NEUROLOGIC:  Awake, alert, oriented to name, place and time. Cranial nerves II-XII are grossly intact. Motor is 5 out of 5 bilaterally. Cerebellar finger to nose, heel to shin intact. Sensory is intact.   Babinski down going, Romberg negative, and gait is normal.  SKIN:  no bruising or bleeding  EDEMA: Normal    Data    Labs Reviewed   CBC WITH AUTO DIFFERENTIAL - Abnormal; Notable for the following components:       Result Value    MPV 11.0 (*)     Neutrophils Absolute 9.4 (*)     Lymphocytes Absolute 0.5 (*)     All other components within normal limits    Narrative:     Performed at:  [facility] [address], [place], [address]   Phone [phone]   COMPREHENSIVE METABOLIC PANEL W/ REFLEX TO MG FOR LOW K - Abnormal; Notable for the following components:    CO2 17 (*)     Glucose 236 (*)     BUN 39 (*)     ALT 51 (*)     AST 48 (*)     All other components within normal limits    Narrative:     Performed at:  [facility] [address], [place], [address]   Phone [phone]   CBC WITH AUTO DIFFERENTIAL - Abnormal; Notable for the following components:    Lymphocytes Absolute 0.4 (*)     All other components within normal limits    Narrative:     Performed at:  [facility] [address], [place], [address]   Phone [phone]   COMPREHENSIVE METABOLIC PANEL W/ REFLEX TO MG FOR LOW K - Abnormal; Notable for the following components:    CO2 17 (*)     Glucose 186 (*)     BUN 37 (*)     Alb 3.3 (*)     Albumin/Globulin Ratio 1.0 (*)     ALT 48 (*)     AST 43 (*)     All other components within normal limits    Narrative:     Performed at:  [facility] [address], [place], [address]   Phone [phone]   POCT GLUCOSE - Abnormal; Notable for the following components:    POC Glucose 164 (*)     All other components within normal limits Narrative:     Performed at:  [facility]  [address] [facility], [address]   Phone [phone]   C DIFF TOXIN/ANTIGEN    Narrative:     ORDER#: [id number]                          ORDERED BY: Temitope Lopez  SOURCE: Stool                              COLLECTED:  12/05/20 23:00  ANTIBIOTICS AT ELLIS.:                      RECEIVED :  12/05/20 23:58  Collect White vial (sterile container)  Performed at:  [facility] [address], [place], [address]   Phone [phone]   CULTURE, BLOOD 1   CULTURE, BLOOD 2   GRAM STAIN   TROPONIN    Narrative:     Performed at:  [facility] [address], [place], [address]   Phone [phone]   BRAIN NATRIURETIC PEPTIDE    Narrative:     Performed at:  [facility] [address],  [place], [address]   Phone [phone]   PROCALCITONIN    Narrative:     Performed at:  [facility] [address],  [place], [employer]   Phone [phone]   C-REACTIVE PROTEIN   D-DIMER, QUANTITATIVE   FERRITIN   FIBRINOGEN   POCT GLUCOSE   POCT GLUCOSE   POCT GLUCOSE   POCT GLUCOSE   POCT GLUCOSE   PREPARE COVID-19 CONVALESCENT PLASMA   TYPE AND SCREEN    Narrative:     Performed at:  [facility] [address], [place], [address]   Phone [phone]     CBC:   Lab Results   Component Value Date    WBC 8.1 12/06/2020    RBC 4.62 12/06/2020    HGB 15.0 12/06/2020    HCT 45.5 12/06/2020    MCV 98.4 12/06/2020    MCH 32.4 12/06/2020    MCHC 32.9 12/06/2020    RDW 14.0 12/06/2020     12/06/2020    MPV 10.4 12/06/2020     CMP:    Lab Results   Component Value Date     12/06/2020    K 4.6 12/06/2020     12/06/2020    CO2 17 12/06/2020    BUN 37 12/06/2020    CREATININE 0.8 12/06/2020    GFRAA >60 12/06/2020    GFRAA >60 05/28/2013    AGRATIO 1.0 12/06/2020 LABGLOM >60 12/06/2020    GLUCOSE 186 12/06/2020    PROT 6.7 12/06/2020    PROT 7.3 11/27/2012    LABALBU 3.3 12/06/2020    CALCIUM 8.9 12/06/2020    BILITOT 0.6 12/06/2020    ALKPHOS 76 12/06/2020    AST 43 12/06/2020    ALT 48 12/06/2020       Imaging    XR CHEST PORTABLE [[id number]]  Collected: 12/05/20 1126        Order Status: Completed  Updated: 12/05/20 1131       Narrative:         EXAMINATION:   ONE XRAY VIEW OF THE CHEST     12/5/2020 11:22 am     COMPARISON:   None. HISTORY:   ORDERING SYSTEM PROVIDED HISTORY: Chest Discomfort   TECHNOLOGIST PROVIDED HISTORY:   Reason for exam:->Chest Discomfort   Reason for Exam: Shortness of Breath (Syncope Thursday in shower, broke ribs   on right side, Covid test positive 12/3, now with increased SOB)   Acuity: Acute   Type of Exam: Initial     FINDINGS:   Heart size at the upper limits of normal.  Bibasilar pulmonary opacities.  No   pulmonary vascular congestion or edema.  No pneumothorax.  No definite right   rib fractures.       Impression:         Bibasilar opacities could represent subsegmental atelectasis, bacterial or   atypical pneumonia        ASSESSMENT      Patient Active Problem List   Diagnosis    Acute respiratory failure with hypoxia (HCC)    Pneumonia due to COVID-19 virus    Acute on chronic respiratory failure with hypoxemia (HCC)         PLAN  1. The patient was admitted to medical floor  2. Started on remdesivir and decadron   3. Pulmonology consulted  4. Resume home medicine  5.  Accu check with low dose sliding scale  6. lovenox for DVT prophylaxis

## 2020-12-06 NOTE — PROGRESS NOTES
Shift assessment complete. See flow sheet. Pain evaluation complete. No complaints of pain at this time. Discussed POC for this shift. Patient requiring 15L HFNC at this time. Patient encouraged to use call light with any needs. Patient states understanding, call light in reach, bed alarm on. Will continue to monitor.

## 2020-12-06 NOTE — FLOWSHEET NOTE
12/06/20 1216   Provider Notification   Reason for Communication Evaluate   Provider Name [facility]   Provider Notification Physician   Method of Communication Secure Message   Response Waiting for response   Notification Time [phone]   \"Sats maintaining mid 80's. Patient up in chair on 15L HFNC. Do you want airvo? \"

## 2020-12-06 NOTE — PROGRESS NOTES
Patient up to commode X1 assist. Had loose BM and assisted back to chair. O2 dropped to 80%. Orders received for heated high flow O2.

## 2020-12-06 NOTE — PROGRESS NOTES
COVID-19 Convalescent Plasma  Transfusion started and pt is responding very well with the transfusion. VSS WNL and No sign of reaction noted, will continue to monitor.

## 2020-12-06 NOTE — CONSULTS
[facility] Pulmonary and Critical Care   Consult Note      Reason for Consult: Acute hypoxemic respiratory failure, COVID-19 pneumonia, fractured ribs  Requesting Physician: Dr Yana Bray:   [facility]:                The patient is a 78 y.o. male with significant past medical history of:  History reviewed. No pertinent past medical history. Patient was diagnosed Covid 19+ earlier in the week at his PCP office. Unfortunately he fell in the shower and fractured some ribs on the right. Had increasingly severe shortness of breath and presented to the emergency room. Was noted to be hypoxemic. Now on 15 L/min HFNC. However, patient denies significant dyspnea. Past Surgical History:    History reviewed. No pertinent surgical history.   Current Medications:    Current Facility-Administered Medications: dexamethasone (DECADRON) tablet 6 mg, 6 mg, Oral, Daily  insulin lispro (1 Unit Dial) 0-6 Units, 0-6 Units, Subcutaneous, TID WC  insulin lispro (1 Unit Dial) 0-3 Units, 0-3 Units, Subcutaneous, Nightly  sodium chloride flush 0.9 % injection 10 mL, 10 mL, Intravenous, 2 times per day  sodium chloride flush 0.9 % injection 10 mL, 10 mL, Intravenous, PRN  [COMPLETED] remdesivir 200 mg in sodium chloride 0.9 % 250 mL IVPB, 200 mg, Intravenous, Once **FOLLOWED BY** remdesivir 100 mg in sodium chloride 0.9 % 250 mL IVPB, 100 mg, Intravenous, Q24H  0.9 % sodium chloride bolus, 30 mL, Intravenous, PRN  0.9 % sodium chloride infusion, , Intravenous, Continuous  sodium chloride flush 0.9 % injection 10 mL, 10 mL, Intravenous, 2 times per day  sodium chloride flush 0.9 % injection 10 mL, 10 mL, Intravenous, PRN  acetaminophen (TYLENOL) tablet 650 mg, 650 mg, Oral, Q6H PRN **OR** acetaminophen (TYLENOL) suppository 650 mg, 650 mg, Rectal, Q6H PRN  polyethylene glycol (GLYCOLAX) packet 17 g, 17 g, Oral, Daily PRN  promethazine (PHENERGAN) tablet 12.5 mg, 12.5 mg, Oral, Q6H PRN **OR** ondansetron (ZOFRAN) angioedema, anaphylaxis and drug reactions  ENDOCRINE:  negative for weight changes and diabetic symptoms including polyuria, polydipsia and polyphagia  MUSCULOSKELETAL:  negative for  pain, joint swelling, decreased range of motion and muscle weakness  NEUROLOGICAL:  negative for headaches, slurred speech, unilateral weakness  PSYCHIATRIC/BEHAVIORAL: negative for hallucinations, behavioral problems, confusion and agitation. Objective:   PHYSICAL EXAM:      VITALS:  /60   Pulse 80   Temp 97.7 °F (36.5 °C) (Temporal)   Resp 17   Ht 6' (1.829 m)   Wt 265 lb 10.5 oz (120.5 kg)   SpO2 (!) 88%   BMI 36.03 kg/m²      24HR INTAKE/OUTPUT:      Intake/Output Summary (Last 24 hours) at 12/6/2020 0930  Last data filed at 12/6/2020 0600  Gross per 24 hour   Intake 247 ml   Output 400 ml   Net -153 ml     CONSTITUTIONAL:  awake, alert, cooperative, no apparent distress, and appears stated age  NECK:  Supple, symmetrical, trachea midline, no adenopathy, thyroid symmetric, not enlarged and no tenderness, skin normal  LUNGS:  no increased work of breathing and clear to auscultation. No accessory muscle use  CARDIOVASCULAR: S1 and S2, no edema and no JVD  ABDOMEN:  normal bowel sounds, non-distended and no masses palpated, and no tenderness to palpation. No hepatospleenomegaly  LYMPHADENOPATHY:  no axillary or supraclavicular adenopathy. No cervical adnenopathy  PSYCHIATRIC: Oriented to person place and time. No obvious depression or anxiety. MUSCULOSKELETAL: No obvious misalignment or effusion of the joints. No clubbing, cyanosis of the digits. SKIN:  normal skin color, texture, turgor and no redness, warmth, or swelling.  No palpable nodules    DATA:    Old records have been reviewed    CBC:  Recent Labs     12/05/20  1108 12/06/20  0449   WBC 10.7 8.1   RBC 5.14 4.62   HGB 16.8 15.0   HCT 50.3 45.5    175   MCV 97.8 98.4   MCH 32.7 32.4   MCHC 33.4 32.9   RDW 13.9 14.0      BMP:  Recent Labs 12/05/20  1108 12/06/20  0449    138   K 4.6 4.6    108   CO2 17* 17*   BUN 39* 37*   CREATININE 1.1 0.8   CALCIUM 9.7 8.9   GLUCOSE 236* 186*      ABG:  No results for input(s): PHART, QPO8YSM, PO2ART, PNA5FCR, H4ZXWKYI, BEART in the last 72 hours. No results found for: BNP  Lab Results   Component Value Date    TROPONINI <0.01 12/05/2020       Cultures:     Abx:    Radiology Review:  All pertinent images / reports were reviewed as a part of this visit. Assessment:     1. Acute hypoxemic respiratory failure  2. COVID-19 pneumonia  3. Rib fracture, acute    I have reviewed laboratories, medical records and images for this visit  Chest x-ray reveals bibasilar airspace disease  This could be due to atelectasis from splinting. However, could also have a component of COVID-19 pneumonia and that certainly fits his clinical course. Requiring 15 L/min HFNC. Saturations are marginal on that  May end up going to heated high flow  Discussed with him the importance of sitting up in the chair and self proning. He may have some difficulty with proning due to pain related to his rib fracture. Received 1 unit convalescent plasma  Plan 5-day course remdesivir  Increase Decadron to 20 mg/day  D-dimer is low  Continue Lovenox 40 mg twice daily  He is diabetic. He does have some sliding scale insulin coverage  May need more as we increase his Decadron we will have to monitor this.

## 2020-12-07 NOTE — PROGRESS NOTES
Pt admission  assessment completed. Pt is alert and orient * 4. VSS  WNL,pt is dyspneic with exertion and ambulate with one assist .Pt follows command. Pt respiration are regular and unlabored and on airvo 50l and 80% Fi02 . Breath sounds diminished with some crackles in the lower lungs. PIV flushed ,patent and CDI. Scheduled medications given as ordered. Patient encouraged to use call light with any needs. Patient states understanding, call light in reach, bed alarm on. All safety checks in place and will continue to monitor pt.

## 2020-12-07 NOTE — ACP (ADVANCE CARE PLANNING)
Advance Care Planning     General Advance Care Planning (ACP) Conversation    Date of Conversation: 12/5/2020  Conducted with: Patient with Decision Making Capacity    Healthcare Decision Maker:      Primary Decision Maker: Ignacio Pickett Child - [phone]    Secondary Decision Maker: Priscilla Ruby - Child - [phone]    Supplemental (Other) Decision Maker: Tamela Rae - Child - [phone]    Today we documented Decision Maker(s) consistent with Legal Next of Kin hierarchy.     Content/Action Overview:  Has NO ACP documents/care preferences - requested patient complete ACP documents  Reviewed DNR/DNI and patient elects Full Code (Attempt Resuscitation)    Length of Voluntary ACP Conversation in minutes:  21 minutes    Kishore Dias      Electronically signed by Kishore Dias, EDENILSON, LSW on 12/7/2020 at 1:55 PM

## 2020-12-07 NOTE — PROGRESS NOTES
13.6 12/07/2020     12/07/2020    MPV 10.3 12/07/2020     CMP:    Lab Results   Component Value Date     12/07/2020    K 4.4 12/07/2020     12/07/2020    CO2 18 12/07/2020    BUN 41 12/07/2020    CREATININE 0.9 12/07/2020    GFRAA >60 12/07/2020    GFRAA >60 05/28/2013    AGRATIO 1.0 12/07/2020    LABGLOM >60 12/07/2020    GLUCOSE 229 12/07/2020    PROT 5.9 12/07/2020    PROT 7.3 11/27/2012    LABALBU 2.9 12/07/2020    CALCIUM 8.5 12/07/2020    BILITOT 0.6 12/07/2020    ALKPHOS 74 12/07/2020    AST 37 12/07/2020    ALT 53 12/07/2020       ASSESSMENT      Patient Active Problem List   Diagnosis    Acute respiratory failure with hypoxia (HCC)    Pneumonia due to COVID-19 virus    Acute on chronic respiratory failure with hypoxemia (HCC)         PLAN  1. Currently on remdesvir and they Decadron  2.  Continuous with low-dose sliding scale

## 2020-12-07 NOTE — CARE COORDINATION
Discharge Planning Assessment    SW discharge planner met with patient to discuss reason for admission, current living situation, and potential needs at the time of discharge. Demographics/Insurance verified:  Yes    Current type of dwelling:  House - no issues w/stairs normally. Living arrangements:  w/son    Level of function/Support:  Pt reported he was independent prior to admission with ADLs and IADLs. Son and dtrs are supportive if needed. PCP:  Dr. Nurys Contreras    Last Visit to PCP:  Last week    DME:  None. Active with any community resources/agencies/skilled home care:  None. No non-skilled needs reported. Medication compliance issues:  No    Financial issues that could impact healthcare:  No    Tentative discharge plan:  Home most likely. May need home oxygen but unsure at this time. SW to follow. Discussed with patient and/or family that on the day of discharge home tentative time of discharge will be between 10 AM and noon. Transportation at the time of discharge:  Children can  if d/c is to home.     Electronically signed by EDENILSON Hammonds, NURIA on 12/7/2020 at 1:54 PM

## 2020-12-08 NOTE — PROGRESS NOTES
PULMONARY AND CRITICAL CARE MEDICINE PROGRESS NOTE    Subjective: Patient remains on heated high flow with FiO2 of 85% and 50 L/min flow. He is not in any respiratory distress. D-dimer is decreasing. REVIEW OF SYSTEMS:   Constitutional symptoms: The patient denies fever, fatigue, night sweats, weight loss or weight gain. HEENT: No vision changes. No tinnitus, Denies sinus pain. No hoarseness, or dysphagia. Neck: Patient denies swelling in the neck. Cardiovascular: Denies chest pain, palpitation, syncope. Respiratory: Denies shortness of breath or cough. Gastrointestinal: Denies nausea, abdominal pain or change in bowel function. Genitourinary: Denies obstructive symptoms. No history of incontinence. Skin: No rashes or itching. Muskuloskeletal: Denies weakness or bone pain. Neurological: No headaches or seizures. Psychiatric: Denies mood swings or depression.      MEDICATIONS:     Scheduled Meds:   vitamin C  500 mg Oral Daily    Vitamin D  1,000 Units Oral Daily    zinc sulfate  50 mg Oral Daily    insulin glargine  10 Units Subcutaneous Nightly    insulin lispro  0-12 Units Subcutaneous TID WC    insulin lispro  0-6 Units Subcutaneous Nightly    dexamethasone (DECADRON) IVPB  20 mg Intravenous Q24H    sodium chloride flush  10 mL Intravenous 2 times per day    remdesivir IVPB  100 mg Intravenous Q24H    sodium chloride flush  10 mL Intravenous 2 times per day    enoxaparin  40 mg Subcutaneous BID    lisinopril  10 mg Oral Daily    tamsulosin  0.4 mg Oral Daily    atorvastatin  40 mg Oral Nightly       Current Infusions:    dextrose         PRN meds:  sodium chloride flush, sodium chloride, sodium chloride flush, acetaminophen **OR** acetaminophen, polyethylene glycol, promethazine **OR** ondansetron, glucose, dextrose, glucagon (rDNA), dextrose, HYDROcodone 5 mg - acetaminophen    PHYSICAL EXAM:  BP (!) 155/92   Pulse 79   Temp 97 °F (36.1 °C) (Temporal)   Resp 14 Ht 6' (1.829 m)   Wt 261 lb 14.5 oz (118.8 kg)   SpO2 (!) 86%   BMI 35.52 kg/m²  I/O last 3 completed shifts: In: 56 [P.O.:600; I.V.:10]  Out: 300 [Urine:300] I/O this shift:  In: -   Out: 300 [Urine:300]      Intake/Output Summary (Last 24 hours) at 12/8/2020 1859  Last data filed at 12/8/2020 1534  Gross per 24 hour   Intake 250 ml   Output 300 ml   Net -50 ml         Limited due to full PPE. CONSTITUTIONAL: No acute distress  HEENT: PERRL. Atraumatic. Normocephalic  NECK: Deferred due to full PPE precautions  CARDIOVASCULAR: NSR on monitor   RESPIRATORY & CHEST: NO use of accessory muscles of respiration  GASTROINTESTINAL & ABDOMEN: Deferred due to full PPE precautions  GENITOURINARY: Deferred. MUSCULOSKELETAL: Deferred due to full PPE precautions  SKIN OF BODY: Deferred due to full PPE precautions  PSYCHIATRIC EVALUATION: Deferred   HEMATOLOGIC/LYMPHATIC/ IMMUNOLOGIC: Deferred due to full PPE precautions  NEUROLOGIC: Deferred due to full PPE precautions                                LABS:    Recent Labs     12/06/20 0449 12/07/20 0438 12/08/20 0429   WBC 8.1 10.3 11.5*   RBC 4.62 4.49 4.58   HGB 15.0 14.8 14.9   HCT 45.5 43.9 45.0   MCH 32.4 32.8 32.4   MCHC 32.9 33.6 33.1   RDW 14.0 13.6 14.0    215 216   MPV 10.4 10.3 9.8   NEUTOPHILPCT 84.3 86.0 76.0   MONOPCT 10.2 9.0 4.0   BASOPCT 0.2 0.0 0.0     Recent Labs     12/06/20  0449 12/07/20 0438 12/08/20 0429    135* 139   K 4.6 4.4 4.4    108 109   ANIONGAP 13 9 10   CO2 17* 18* 20*   BUN 37* 41* 35*   CREATININE 0.8 0.9 0.7*   CALCIUM 8.9 8.5 8.7   PROT 6.7 5.9* 5.9*   BILITOT 0.6 0.6 0.7   ALKPHOS 76 74 78   ALT 48* 53* 63*   AST 43* 37 41*   GLUCOSE 186* 229* 218*     No results for input(s): PHART, AMM9DZC, PO2ART, JQH1OLN, Q1TDZKGX, BEART, D0ZWAXUO in the last 72 hours.       IMPRESSION:  Acute hypoxic respiratory failure  COVID-19 pneumonia  Acute right rib fractures  Diabetes mellitus type 2     PLAN:  Patient continues to require heated high flow. Heated high flow has been reduced to 85% 50 L/min. He is not in any respiratory distress. If FiO2 requirements continues to increase, then noninvasive positive pressure ventilation can be tried. Titrate FiO2 for saturation of of 90 to 92%.     D-dimer is decreasing. Patient is currently on high-dose Lovenox twice daily but not on therapeutic Lovenox. Concern for possible falls related bleeding.      Continue Decadron 20 mg IV daily.     Continue remdesivir, day 4. He is status post 1 unit of convalescent plasma.     Recommend prone positioning in the bed as possible.     Keep blood sugar between 140 to 180 mg/dL.     Adequate pulmonary toilet. Out of bed in chair. Encourage prone positioning in the bed. Teresa Paniagua MD  Pulmonary Critical Care and Sleep Medicine  12/8/2020, 6:59 PM    This note was completed using dragon medical speech recognition software. Grammatical errors, random word insertions, pronoun errors and incomplete sentences are occasional consequences of this technology due to software limitations. If there are questions or concerns about the content of this note of information contained within the body of this dictation they should be addressed with the provider for clarification.

## 2020-12-08 NOTE — PROGRESS NOTES
New IV placed left forearm without difficulty. No changes noted in assessment. Sleeping between care. Attempted to prone but unable to maintain position. No other needs expressed. Will continue to monitor.

## 2020-12-08 NOTE — PROGRESS NOTES
Shift assessment complete. See flow sheet. Pain evaluation complete. No complaints of pain at this time. Discussed POC for this shift. Patient currently on airvo, 60L, 93%. Patient appears to be tolerating well. Up to chair for breakfast.  Patient encouraged to use call light with any needs. Patient states understanding, call light in reach, chair alarm on, will continue to monitor.

## 2020-12-08 NOTE — H&P
Hospital Medicine History & Physical      PCP: Cecelia Closs, MD    Date of Admission: 12/5/2020    Date of Service: Pt seen/examined on  12/8/2020 and Admitted to Inpatient   Chief Complaint:    SOB       History Of Present Illness:       Christiane Marx is a 78 y.o. male who presents here to the emergency department, the patient states that on Thursday, he fell in the shower, and landed on his right side. Although his chest x-ray was unremarkable the assumption is that he broke some ribs. He saw his family doctor who ordered a Covid test and it was positive. He has continued to have shortness of breath, and today has had worsening oxygen saturation rate, decreased appetite, decreased smell and taste, congestion, runny nose generally not feeling well. He denies any abdominal pain. He was transferred to ICU due to increased oxygen need. Currently on heated high flow oxygen 45 liters a minute at 70% fio2  He looks comfortable        Past Medical History:        Diagnosis Date    Diabetes mellitus ([address]     HTN (hypertension)        Past Surgical History:    History reviewed. No pertinent surgical history. Medications Prior to Admission:    Prior to Admission medications    Medication Sig Start Date End Date Taking?  Authorizing Provider   SITagliptin (JANUVIA) 100 MG tablet Take 100 mg by mouth daily   Yes Historical Provider, MD   atorvastatin (LIPITOR) 40 MG tablet Take 40 mg by mouth daily   Yes Historical Provider, MD   lisinopril (PRINIVIL;ZESTRIL) 10 MG tablet Take 10 mg by mouth daily   Yes Historical Provider, MD   meloxicam (MOBIC) 15 MG tablet Take 15 mg by mouth daily   Yes Historical Provider, MD   pioglitazone (ACTOS) 15 MG tablet Take 15 mg by mouth 2 times daily   Yes Historical Provider, MD   metFORMIN (GLUCOPHAGE) 850 MG tablet Take 850 mg by mouth 2 times daily (with meals)   Yes Historical Provider, MD   tamsulosin (FLOMAX) 0.4 MG capsule Take 0.4 mg by mouth 2 times daily   Yes Historical Provider, MD   aspirin 81 MG chewable tablet Take 81 mg by mouth daily   Yes Historical Provider, MD       Allergies:  Patient has no known allergies. Social History:  The patient currently lives at home     TOBACCO:   reports that he has quit smoking. He has never used smokeless tobacco.  ETOH:   reports previous alcohol use. Family History:  Reviewed in detail and negative for DM, Early CAD, Cancer, CVA. Positive as follows:    History reviewed. No pertinent family history. REVIEW OF SYSTEMS:   Positive for sob and fall at home,  as noted in the HPI. All other systems reviewed and negative. PHYSICAL EXAM:    /71   Pulse 72   Temp 97 °F (36.1 °C) (Temporal)   Resp 14   Ht 6' (1.829 m)   Wt 261 lb 14.5 oz (118.8 kg)   SpO2 (!) 89%   BMI 35.52 kg/m²     General appearance: No apparent distress appears stated age and cooperative. HEENT Normal cephalic, atraumatic without obvious deformity. Pupils equal, round, and reactive to light. Extra ocular muscles intact. Conjunctivae/corneas clear. Neck: Supple, No jugular venous distention/bruits. Trachea midline without thyromegaly or adenopathy with full range of motion. Lungs: Clear to auscultation, has some rhonchi but otherwise clear. Heart: Regular rate and rhythm with Normal S1/S2 without murmurs, rubs or gallops, point of maximum impulse non-displaced  Abdomen: Soft, non-tender or non-distended without rigidity or guarding and positive bowel sounds all four quadrants. Extremities: No clubbing, cyanosis, or edema bilaterally. Full range of motion without deformity and normal gait intact. Skin: Skin color, texture, turgor normal.  No rashes or lesions.   Neurologic: Alert and oriented X 3, neurovascularly intact with sensory/motor intact upper extremities/lower extremities, Continue Regimen: Fluocinonide 0.05% AAA - index fingers and cuticles BID until clear bilaterally. Cranial nerves: II-XII intact, grossly non-focal.  Mental status: Alert, oriented, thought content appropriate. Capillary Refill: Acceptable  < 3 seconds  Peripheral Pulses: +3 Easily felt, not easily obliterated with pressure      CXR:  I have reviewed the CXR with the following interpretation:   Bibasilar opacities could represent subsegmental atelectasis, bacterial or    atypical pneumonia        EKG:  I have reviewed the EKG with the following interpretation: Sinus tachycardia with 1st degree A-V blockRight bundle branch blockLeft anterior fascicular block    CBC   Recent Labs     12/06/20 0449 12/07/20 0438 12/08/20 0429   WBC 8.1 10.3 11.5*   HGB 15.0 14.8 14.9   HCT 45.5 43.9 45.0    215 216      RENAL  Recent Labs     12/06/20 0449 12/07/20 0438 12/08/20 0429    135* 139   K 4.6 4.4 4.4    108 109   CO2 17* 18* 20*   BUN 37* 41* 35*   CREATININE 0.8 0.9 0.7*     LFT'S  Recent Labs     12/06/20 0449 12/07/20 0438 12/08/20 0429   AST 43* 37 41*   ALT 48* 53* 63*   BILITOT 0.6 0.6 0.7   ALKPHOS 76 74 78     COAG  No results for input(s): INR in the last 72 hours. CARDIAC ENZYMES  No results for input(s): CKTOTAL, CKMB, CKMBINDEX, TROPONINI in the last 72 hours. U/A:  No results found for: NITRITE, COLORU, WBCUA, RBCUA, MUCUS, BACTERIA, CLARITYU, SPECGRAV, LEUKOCYTESUR, BLOODU, GLUCOSEU, AMORPHOUS    ABG  No results found for: YSH0ONF, BEART, P8UOLIWW, PHART, THGBART, JNG4BAL, PO2ART, HRL3QHF        Active Hospital Problems    Diagnosis Date Noted    Acute respiratory failure with hypoxia ([facility] [address] [J96.01] 12/05/2020    Pneumonia due to COVID-19 virus [U07.1, J12.89] 12/05/2020    Acute on chronic respiratory failure with hypoxemia [facility]) [J96.21] 12/05/2020         PHYSICIANS CERTIFICATION:    I certify that Enid Gonzales is expected to be hospitalized for greater  than 2 midnights based on the following assessment and plan:      ASSESSMENT/PLAN:    1.  Acute Detail Level: Simple respiratory failure  -heated high flow oxygen to keep sat 88-92%  - Secondary to COVID-19 pneumonia    2. COVID-19  - Has received CP  -on Remdesivir  -on Decadron  - vitamins for immune support. -D-dimer is 763, Lovenox 40 BID    3. Diabetes mellitus  - sugar up from steroids  - on sliding scale insulin and Lantus  - normally on oral meds at home    4. Leukocytosis  - likely secondary to steroids. Will monitor. DVT Prophylaxis: lovenox Bid   Diet: DIET CARB CONTROL;  Code Status: Full Code  PT/OT Eval Status: when appropriate     Dispo - patient has been increased to ICU level of care. He is in [facility] ICU. Solomon Puga MD    Thank you Checo Godfrey MD for the opportunity to be involved in this patient's care.  If you have any questions or concerns please feel free to contact me at [phone]

## 2020-12-08 NOTE — PROGRESS NOTES
PULMONARY AND CRITICAL CARE MEDICINE PROGRESS NOTE    Subjective: Patient remains on heated high flow at 60 L/min flow was FiO2 of 90%. He saturating in the upper 90s. D-dimer is elevated. CRP is down to 16. REVIEW OF SYSTEMS:   Constitutional symptoms: The patient denies fever, fatigue, night sweats, weight loss or weight gain. HEENT: No vision changes. No tinnitus, Denies sinus pain. No hoarseness, or dysphagia. Neck: Patient denies swelling in the neck. Cardiovascular: Denies chest pain, palpitation, syncope. Respiratory: Positive for shortness of breath or cough. Gastrointestinal: Denies nausea, abdominal pain or change in bowel function. Genitourinary: Denies obstructive symptoms. No history of incontinence. Skin: No rashes or itching. Muskuloskeletal: Denies weakness or bone pain. Neurological: No headaches or seizures. Psychiatric: Denies mood swings or depression.      MEDICATIONS:     Scheduled Meds:   insulin glargine  10 Units Subcutaneous Nightly    insulin lispro  0-12 Units Subcutaneous TID WC    insulin lispro  0-6 Units Subcutaneous Nightly    dexamethasone (DECADRON) IVPB  20 mg Intravenous Q24H    sodium chloride flush  10 mL Intravenous 2 times per day    remdesivir IVPB  100 mg Intravenous Q24H    sodium chloride flush  10 mL Intravenous 2 times per day    enoxaparin  40 mg Subcutaneous BID    lisinopril  10 mg Oral Daily    tamsulosin  0.4 mg Oral Daily    atorvastatin  40 mg Oral Nightly       Current Infusions:    dextrose         PRN meds:  sodium chloride flush, sodium chloride, sodium chloride flush, acetaminophen **OR** acetaminophen, polyethylene glycol, promethazine **OR** ondansetron, glucose, dextrose, glucagon (rDNA), dextrose, HYDROcodone 5 mg - acetaminophen    PHYSICAL EXAM:  /70   Pulse 70   Temp 97.4 °F (36.3 °C) (Temporal)   Resp 18   Ht 6' (1.829 m)   Wt 265 lb 12.8 oz (120.6 kg)   SpO2 93%   BMI 36.05 kg/m²  I/O last 3 completed shifts: In: 480 [P.O.:480]  Out: 1000 [Urine:1000] No intake/output data recorded. Intake/Output Summary (Last 24 hours) at 12/7/2020 1904  Last data filed at 12/7/2020 0902  Gross per 24 hour   Intake --   Output 800 ml   Net -800 ml         Limited due to full PPE. CONSTITUTIONAL: No acute distress  HEENT: PERRL. Atraumatic. Normocephalic  NECK: Deferred due to full PPE precautions  CARDIOVASCULAR: NSR on monitor   RESPIRATORY & CHEST: NO use of accessory muscles of respiration  GASTROINTESTINAL & ABDOMEN: Deferred due to full PPE precautions  GENITOURINARY: Deferred. MUSCULOSKELETAL: Deferred due to full PPE precautions  SKIN OF BODY: Deferred due to full PPE precautions  PSYCHIATRIC EVALUATION: Deferred   HEMATOLOGIC/LYMPHATIC/ IMMUNOLOGIC: Deferred due to full PPE precautions  NEUROLOGIC: Deferred due to full PPE precautions                                LABS:    Recent Labs     12/05/20  1108 12/06/20  0449 12/07/20  0438   WBC 10.7 8.1 10.3   RBC 5.14 4.62 4.49   HGB 16.8 15.0 14.8   HCT 50.3 45.5 43.9   MCH 32.7 32.4 32.8   MCHC 33.4 32.9 33.6   RDW 13.9 14.0 13.6    175 215   MPV 11.0* 10.4 10.3   NEUTOPHILPCT 87.9 84.3 86.0   MONOPCT 7.6 10.2 9.0   BASOPCT 0.2 0.2 0.0     Recent Labs     12/05/20  1108 12/06/20  0449 12/07/20  0438    138 135*   K 4.6 4.6 4.4    108 108   ANIONGAP 16 13 9   CO2 17* 17* 18*   BUN 39* 37* 41*   CREATININE 1.1 0.8 0.9   CALCIUM 9.7 8.9 8.5   PROT 6.8 6.7 5.9*   BILITOT 0.9 0.6 0.6   ALKPHOS 85 76 74   ALT 51* 48* 53*   AST 48* 43* 37   GLUCOSE 236* 186* 229*           IMPRESSION:  Acute hypoxic respiratory failure  COVID-19 pneumonia  Acute right rib fractures  Diabetes mellitus type 2    PLAN:  Patient continues to require heated high flow up to % with 6 L/min flow. He is not in any respiratory distress. If FiO2 requirements continues to increase, then noninvasive positive pressure ventilation can be tried.   Titrate FiO2 for saturation of of 90 to 92%. D-dimer is elevated, more than 1500. Patient is currently on high-dose Lovenox twice daily but not on therapeutic Lovenox. Concern for possible falls related bleeding. If D-dimer continues to increase by tomorrow, then I would recommend to switch to therapeutic Lovenox. Continue Decadron 10 mg IV daily. Continue remdesivir. He is status post 1 unit of convalescent plasma. Recommend prone positioning in the bed as possible. Keep blood sugar between 140 to 180 mg/dL. Critical Care Time: 35 minutes   Total critical care time caring for this patient with life threatening illness, including direct patient contact, management of life support systems, review of data including imaging and labs, discussions with other team members and physicians excluding procedures. Ania Castañeda MD  Pulmonary Critical Care and Sleep Medicine  12/7/2020, 7:04 PM    This note was completed using dragon medical speech recognition software. Grammatical errors, random word insertions, pronoun errors and incomplete sentences are occasional consequences of this technology due to software limitations. If there are questions or concerns about the content of this note of information contained within the body of this dictation they should be addressed with the provider for clarification.

## 2020-12-08 NOTE — PROGRESS NOTES
PM assessment complete and documented. Meds given per MAR. IV accidentally dislodged with getting up to [facility]. Tolerated fairly well. No needs or concerns expressed. Will continue to monitor.

## 2020-12-08 NOTE — PROGRESS NOTES
Nutrition Assessment     Type and Reason for Visit: Initial, Positive Nutrition Screen(MST=2)    Nutrition Recommendations/Plan:   Monitor for PO intake     Nutrition Assessment:  Pt is at risk for nutrition compromise AEB weight loss. Pt states losing 26lbs but unable to decipher a time frame. . Pt states wt loss was from poor appetite although appetite and intake are improving. Recorded PO intake % at all meals since admission. Will follow at low risk as PO intake remains greater than 50%. Malnutrition Assessment:  Malnutrition Status: No malnutrition    Nutrition Related Findings: No edema noted. Current Nutrition Therapies:    DIET CARB CONTROL; Anthropometric Measures:  · Height: 6' (182.9 cm)  · Current Body Wt: 261 lb (118.4 kg)   · BMI: 35.4    Nutrition Diagnosis:   · Unintended weight loss related to inadequate protein-energy intake as evidenced by poor intake prior to admission, weight loss      Nutrition Interventions:   Food and/or Nutrient Delivery:  Continue Current Diet  Nutrition Education/Counseling:  Education not indicated, No recommendation at this time   Coordination of Nutrition Care:  Continue to monitor while inpatient    Goals:  Continued PO intake greater than 50% at all meals.        Nutrition Monitoring and Evaluation:   Behavioral-Environmental Outcomes:  None Identified   Food/Nutrient Intake Outcomes:  Food and Nutrient Intake  Physical Signs/Symptoms Outcomes:  Weight     Discharge Planning:    No discharge needs at this time     Electronically signed by Joy Barnes RD, LIZA on 12/8/20 at 10:03 AM EST    Contact: [phone]

## 2020-12-09 NOTE — PROGRESS NOTES
Reassessment completed,Pt demand for oxygen  increase hence increase airvo to 100% and 50L. M patient denies needs at this time, call light in reach, will continue to monitor.

## 2020-12-09 NOTE — H&P
PCP: Cecelia Closs, MD     Date of Admission: 12/5/2020    Date of Service: Pt seen/examined on  12/8/2020 and Admitted to Inpatient   Chief Complaint:    SOB       breif lance Marx is a 78 y.o. male who presents here to the emergency department, the patient states that on Thursday, he fell in the shower, and landed on his right side. Although his chest x-ray was unremarkable the assumption is that he broke some ribs. He saw his family doctor who ordered a Covid test and it was positive. He has continued to have shortness of breath, and today has had worsening oxygen saturation rate, decreased appetite, decreased smell and taste, congestion, runny nose generally not feeling well. He denies any abdominal pain. He was transferred to ICU due to increased oxygen need. Currently on heated high flow oxygen 55 liters a minute at 90% fio2  He looks comfortable but this is worse than yesterday.           Medications Prior to Admission:    Prior to Admission medications    Medication Sig Start Date End Date Taking?  Authorizing Provider   SITagliptin (JANUVIA) 100 MG tablet Take 100 mg by mouth daily   Yes Historical Provider, MD   atorvastatin (LIPITOR) 40 MG tablet Take 40 mg by mouth daily   Yes Historical Provider, MD   lisinopril (PRINIVIL;ZESTRIL) 10 MG tablet Take 10 mg by mouth daily   Yes Historical Provider, MD   meloxicam (MOBIC) 15 MG tablet Take 15 mg by mouth daily   Yes Historical Provider, MD   pioglitazone (ACTOS) 15 MG tablet Take 15 mg by mouth 2 times daily   Yes Historical Provider, MD   metFORMIN (GLUCOPHAGE) 850 MG tablet Take 850 mg by mouth 2 times daily (with meals)   Yes Historical Provider, MD   tamsulosin (FLOMAX) 0.4 MG capsule Take 0.4 mg by mouth 2 times daily   Yes Historical Provider, MD   aspirin 81 MG chewable tablet Take 81 mg by mouth daily   Yes Historical Provider, MD       REVIEW OF SYSTEMS:   Positive for sob and fall at home,  as noted in the HPI. All other systems reviewed and negative. PHYSICAL EXAM:    /60   Pulse 69   Temp 97 °F (36.1 °C) (Temporal)   Resp 17   Ht 6' (1.829 m)   Wt 261 lb 14.5 oz (118.8 kg)   SpO2 94%   BMI 35.52 kg/m²     General appearance: No apparent distress appears stated age and cooperative. HEENT Normal cephalic, atraumatic without obvious deformity. Pupils equal, round, and reactive to light. Extra ocular muscles intact. Conjunctivae/corneas clear. Neck: Supple, No jugular venous distention/bruits. Trachea midline without thyromegaly or adenopathy with full range of motion. Lungs: some rales on the left lower lobe. Heart: Regular rate and rhythm with Normal S1/S2 without murmurs, rubs or gallops, point of maximum impulse non-displaced  Abdomen: Soft, non-tender or non-distended without rigidity or guarding and positive bowel sounds all four quadrants. Extremities: No clubbing, cyanosis, or edema bilaterally. Full range of motion without deformity and normal gait intact. Skin: Skin color, texture, turgor normal.  No rashes or lesions. Neurologic: Alert and oriented X 3, neurovascularly intact with sensory/motor intact upper extremities/lower extremities, bilaterally. Cranial nerves: II-XII intact, grossly non-focal.  Mental status: Alert, oriented, thought content appropriate.   Capillary Refill: Acceptable  < 3 seconds  Peripheral Pulses: +3 Easily felt, not easily obliterated with pressure      CXR:  I have reviewed the CXR with the following interpretation:   Bibasilar opacities could represent subsegmental atelectasis, bacterial or    atypical pneumonia        EKG:  I have reviewed the EKG with the following interpretation: Sinus tachycardia with 1st degree A-V blockRight bundle branch blockLeft anterior fascicular block    CBC   Recent Labs     12/07/20  7436

## 2020-12-09 NOTE — PROGRESS NOTES
Shift assessment complete. See flow sheet. Pain evaluation complete. No complaints of pain at this time. Discussed POC for this shift. Patient up to chair this morning for breakfast. Continues on airvo, 55L, 90%. Patient encouraged to use call light with any needs. Patient states understanding, call light in reach, chair alarm on. Will continue to monitor.

## 2020-12-10 NOTE — PROGRESS NOTES
Hospitalist Progress Note      PCP: Thien Rodrigez MD    Date of Admission: 12/5/2020    Chief Complaint: SOB    Hospital Course:   Mary Carmen Crocker a 78 y. o. male who presents here to the emergency department, the patient states that on Thursday, he fell in the shower, and landed on his right side.  Although his chest x-ray was unremarkable the assumption is that he broke some ribs.  He saw his family doctor who ordered a Covid test and it was positive. Yamile Barragan has continued to have shortness of breath, and today has had worsening oxygen saturation rate, decreased appetite, decreased smell and taste, congestion, runny nose generally not feeling well.  He denies any abdominal pain.     He was transferred to ICU due to increased oxygen need.      Currently on heated high flow oxygen 55 liters a minute at 75% fio2  He looks comfortable      Subjective:   Reports he feels pretty good.         Medications:  Reviewed    Infusion Medications    dextrose       Scheduled Medications    insulin glargine  20 Units Subcutaneous BID    vitamin C  500 mg Oral Daily    Vitamin D  1,000 Units Oral Daily    zinc sulfate  50 mg Oral Daily    insulin lispro  0-12 Units Subcutaneous TID WC    insulin lispro  0-6 Units Subcutaneous Nightly    dexamethasone (DECADRON) IVPB  20 mg Intravenous Q24H    sodium chloride flush  10 mL Intravenous 2 times per day    sodium chloride flush  10 mL Intravenous 2 times per day    enoxaparin  40 mg Subcutaneous BID    lisinopril  10 mg Oral Daily    tamsulosin  0.4 mg Oral Daily    atorvastatin  40 mg Oral Nightly     PRN Meds: sodium chloride flush, sodium chloride, sodium chloride flush, acetaminophen **OR** acetaminophen, polyethylene glycol, promethazine **OR** ondansetron, glucose, dextrose, glucagon (rDNA), dextrose, HYDROcodone 5 mg - acetaminophen      Intake/Output Summary (Last 24 hours) at 12/10/2020 1621  Last data filed at 12/10/2020 0812  Gross per 24 hour Intake 240 ml   Output 1775 ml   Net -1535 ml       Physical Exam Performed:    /82   Pulse 77   Temp 97.6 °F (36.4 °C) (Temporal)   Resp 22   Ht 6' (1.829 m)   Wt 262 lb 9.1 oz (119.1 kg)   SpO2 94%   BMI 35.61 kg/m²     General appearance: No apparent distress, appears stated age and cooperative. HEENT: Pupils equal, round, and reactive to light. Conjunctivae/corneas clear. Neck: Supple, with full range of motion. No jugular venous distention. Trachea midline. Respiratory:  Normal respiratory effort. Clear to auscultation, bilaterally without Rales/Wheezes/Rhonchi. Cardiovascular: Regular rate and rhythm with normal S1/S2 without murmurs, rubs or gallops. Abdomen: Soft, non-tender, non-distended with normal bowel sounds. Musculoskeletal: No clubbing, cyanosis or edema bilaterally. Full range of motion without deformity. Skin: Skin color, texture, turgor normal.  No rashes or lesions. Neurologic:  Neurovascularly intact without any focal sensory/motor deficits. Cranial nerves: II-XII intact, grossly non-focal.  Psychiatric: Alert and oriented, thought content appropriate, normal insight  Capillary Refill: Brisk,< 3 seconds   Peripheral Pulses: +2 palpable, equal bilaterally       Labs:   Recent Labs     12/08/20  0429 12/09/20  0447 12/10/20  0420   WBC 11.5* 9.7 11.4*   HGB 14.9 14.4 14.6   HCT 45.0 43.7 43.8    226 230     Recent Labs     12/08/20  0429 12/09/20  0447 12/10/20  0420    134* 136   K 4.4 4.7 5.0    107 106   CO2 20* 19* 22   BUN 35* 32* 33*   CREATININE 0.7* 0.8 0.8   CALCIUM 8.7 8.9 8.7     Recent Labs     12/08/20  0429 12/09/20  0447 12/10/20  0420   AST 41* 25 23   ALT 63* 60* 50*   BILITOT 0.7 0.8 0.8   ALKPHOS 78 83 77     No results for input(s): INR in the last 72 hours. No results for input(s): Cary Honey in the last 72 hours.     Urinalysis:    No results found for: Alena Tee, [address], BACTERIA, RBCUA, BLOODU, [address], GLUCOSEU    Radiology:  XR CHEST PORTABLE   Final Result   Bibasilar opacities could represent subsegmental atelectasis, bacterial or   atypical pneumonia                 Assessment/Plan:    Active Hospital Problems    Diagnosis    Acute respiratory failure with hypoxia ([address] [J96.01]    Pneumonia due to COVID-19 virus [U07.1, J12.89]    Acute on chronic respiratory failure with hypoxemia ([address] [J96.21]     . Acute respiratory failure  -heated high flow oxygen to keep sat 88-92%  - Secondary to COVID-19 pneumonia  -currently on 55l/min at 75% fio2     2. COVID-19  - Has received CP  -on Remdesivir  -on Decadron  - vitamins for immune support. -D-dimer is 763, Lovenox 40 BID     3. Diabetes mellitus  - sugar up from steroids  - on sliding scale insulin and Lantus  - normally on oral meds at home     4. Leukocytosis  - likely secondary to steroids. Will monitor.              DVT Prophylaxis: lovenoxs  Diet: DIET CARB CONTROL;  Code Status: Full Code    PT/OT Eval Status: eval and treat     Dispo - CCC  32 min critical care time spent.      Jose Raul Francois MD

## 2020-12-10 NOTE — PROGRESS NOTES
PULMONARY AND CRITICAL CARE MEDICINE PROGRESS NOTE    Subjective: Patient remains on heated high flow. He is on 85% FiO2 with 55 L/min flow. Not in any respiratory distress. He is up in the chair. REVIEW OF SYSTEMS:   Constitutional symptoms: The patient denies fever, fatigue, night sweats, weight loss or weight gain. HEENT: No vision changes. No tinnitus, Denies sinus pain. No hoarseness, or dysphagia. Neck: Patient denies swelling in the neck. Cardiovascular: Denies chest pain, palpitation, syncope. Respiratory: Denies shortness of breath or cough. Gastrointestinal: Denies nausea, abdominal pain or change in bowel function. Genitourinary: Denies obstructive symptoms. No history of incontinence. Skin: No rashes or itching. Muskuloskeletal: Denies weakness or bone pain. Neurological: No headaches or seizures. Psychiatric: Denies mood swings or depression.      MEDICATIONS:     Scheduled Meds:   insulin glargine  20 Units Subcutaneous BID    vitamin C  500 mg Oral Daily    Vitamin D  1,000 Units Oral Daily    zinc sulfate  50 mg Oral Daily    insulin lispro  0-12 Units Subcutaneous TID WC    insulin lispro  0-6 Units Subcutaneous Nightly    dexamethasone (DECADRON) IVPB  20 mg Intravenous Q24H    sodium chloride flush  10 mL Intravenous 2 times per day    sodium chloride flush  10 mL Intravenous 2 times per day    enoxaparin  40 mg Subcutaneous BID    lisinopril  10 mg Oral Daily    tamsulosin  0.4 mg Oral Daily    atorvastatin  40 mg Oral Nightly       Current Infusions:    dextrose         PRN meds:  sodium chloride flush, sodium chloride, sodium chloride flush, acetaminophen **OR** acetaminophen, polyethylene glycol, promethazine **OR** ondansetron, glucose, dextrose, glucagon (rDNA), dextrose, HYDROcodone 5 mg - acetaminophen    PHYSICAL EXAM:  /82   Pulse 77   Temp 97.6 °F (36.4 °C) (Temporal)   Resp 22   Ht 6' (1.829 m)   Wt 262 lb 9.1 oz (119.1 kg)   SpO2 94%   BMI 35.61 kg/m²  I/O last 3 completed shifts: In: 240 [P.O.:240]  Out: 1775 [Urine:1775] No intake/output data recorded. Intake/Output Summary (Last 24 hours) at 12/10/2020 1711  Last data filed at 12/10/2020 9066  Gross per 24 hour   Intake 240 ml   Output 1425 ml   Net -1185 ml         Limited due to full PPE. CONSTITUTIONAL: No acute distress  HEENT: PERRL. Atraumatic. Normocephalic  NECK: Deferred due to full PPE precautions  CARDIOVASCULAR: NSR on monitor   RESPIRATORY & CHEST: NO use of accessory muscles of respiration  GASTROINTESTINAL & ABDOMEN: Deferred due to full PPE precautions  GENITOURINARY: Deferred. MUSCULOSKELETAL: Deferred due to full PPE precautions  SKIN OF BODY: Deferred due to full PPE precautions  PSYCHIATRIC EVALUATION: Deferred   HEMATOLOGIC/LYMPHATIC/ IMMUNOLOGIC: Deferred due to full PPE precautions  NEUROLOGIC: Deferred due to full PPE precautions                                LABS:    Recent Labs     12/08/20  0429 12/09/20  0447 12/10/20  0420   WBC 11.5* 9.7 11.4*   RBC 4.58 4.43 4.51   HGB 14.9 14.4 14.6   HCT 45.0 43.7 43.8   MCH 32.4 32.5 32.3   MCHC 33.1 33.0 33.2   RDW 14.0 13.7 13.7    226 230   MPV 9.8 10.2 10.9*   NEUTOPHILPCT 76.0 83.0 85.0   MONOPCT 4.0 6.0 8.0   BASOPCT 0.0 0.0 0.0     Recent Labs     12/08/20  0429 12/09/20  0447 12/10/20  0420    134* 136   K 4.4 4.7 5.0    107 106   ANIONGAP 10 8 8   CO2 20* 19* 22   BUN 35* 32* 33*   CREATININE 0.7* 0.8 0.8   CALCIUM 8.7 8.9 8.7   PROT 5.9* 5.6* 5.8*   BILITOT 0.7 0.8 0.8   ALKPHOS 78 83 77   ALT 63* 60* 50*   AST 41* 25 23   GLUCOSE 218* 268* 297*       IMPRESSION:  Acute hypoxic respiratory failure  COVID-19 pneumonia  Acute right rib fractures  Diabetes mellitus type 2     PLAN:  Patient continues to require heated high flow. Heated high flow has been reduced to 85% 50 L/min.  He is not in any respiratory distress.  If FiO2 requirements continues to increase, then noninvasive positive pressure ventilation can be tried.  Titrate FiO2 for saturation for sats of 90 to 92%.     D-dimer is decreasing.  Patient is currently on high-dose Lovenox twice daily but not on therapeutic Lovenox.  Concern for possible falls related bleeding.      Continue Decadron 20 mg IV daily.     Continue remdesivir, day 5.  He is status post 1 unit of convalescent plasma.     Recommend prone positioning in the bed as possible.     Keep blood sugar between 140 to 180 mg/dL. Increase Lantus.     Adequate pulmonary toilet. Out of bed in chair. Encourage prone positioning in the bed. Amira Rich MD  Pulmonary Critical Care and Sleep Medicine  12/10/2020, 5:11 PM    This note was completed using dragon medical speech recognition software. Grammatical errors, random word insertions, pronoun errors and incomplete sentences are occasional consequences of this technology due to software limitations. If there are questions or concerns about the content of this note of information contained within the body of this dictation they should be addressed with the provider for clarification.

## 2020-12-10 NOTE — PROGRESS NOTES
Morning assessment complete. Patient presents pleasant and cooperative this morning. Vital signs have remained stable, and patient is noted to be on Airvo for oxygenation support. Refer to [facility] for details regarding settings. The patient presents AOE x4 and pupils are round and reactive. Patient's lung sounds overall diminished no adventitious sounds noted. The heart is regular and is reading SA on the monitor. Pulses are palpable and the skin is warm and dry. The abdomen is soft and warm, bowel sounds are heard in all four quadrants. Patient reports passing gas and is voiding without difficultly. Patient denies any pain or needs at this time. Will continue to monitor.

## 2020-12-10 NOTE — PROGRESS NOTES
Shift assessment complete. Patient up in chair and on airvo 87% FiO2 55L and stable. VSS. Denies any pain at this time. Medications administered per order. Call light in reach and fall precautions in place.

## 2020-12-10 NOTE — PROGRESS NOTES
Take 81 mg by mouth daily   Yes Historical Provider, MD       REVIEW OF SYSTEMS:   Positive for sob and fall at home,  as noted in the HPI. All other systems reviewed and negative. PHYSICAL EXAM:    /82   Pulse 77   Temp 97.6 °F (36.4 °C) (Temporal)   Resp 22   Ht 6' (1.829 m)   Wt 262 lb 9.1 oz (119.1 kg)   SpO2 94%   BMI 35.61 kg/m²     General appearance: No apparent distress appears stated age and cooperative. HEENT Normal cephalic, atraumatic without obvious deformity. Pupils equal, round, and reactive to light. Extra ocular muscles intact. Conjunctivae/corneas clear. Neck: Supple, No jugular venous distention/bruits. Trachea midline without thyromegaly or adenopathy with full range of motion. Lungs: some rales on the left lower lobe. Heart: Regular rate and rhythm with Normal S1/S2 without murmurs, rubs or gallops, point of maximum impulse non-displaced  Abdomen: Soft, non-tender or non-distended without rigidity or guarding and positive bowel sounds all four quadrants. Extremities: No clubbing, cyanosis, or edema bilaterally. Full range of motion without deformity and normal gait intact. Skin: Skin color, texture, turgor normal.  No rashes or lesions. Neurologic: Alert and oriented X 3, neurovascularly intact with sensory/motor intact upper extremities/lower extremities, bilaterally. Cranial nerves: II-XII intact, grossly non-focal.  Mental status: Alert, oriented, thought content appropriate.   Capillary Refill: Acceptable  < 3 seconds  Peripheral Pulses: +3 Easily felt, not easily obliterated with pressure      CXR:  I have reviewed the CXR with the following interpretation:   Bibasilar opacities could represent subsegmental atelectasis, bacterial or    atypical pneumonia        EKG:  I have reviewed the EKG with the following interpretation: Sinus tachycardia with 1st degree A-V blockRight bundle branch blockLeft anterior fascicular block    CBC   Recent Labs     12/08/20  5476 12/09/20  0447 12/10/20  0420   WBC 11.5* 9.7 11.4*   HGB 14.9 14.4 14.6   HCT 45.0 43.7 43.8    226 230      RENAL  Recent Labs     12/08/20  0429 12/09/20  0447 12/10/20  0420    134* 136   K 4.4 4.7 5.0    107 106   CO2 20* 19* 22   BUN 35* 32* 33*   CREATININE 0.7* 0.8 0.8     LFT'S  Recent Labs     12/08/20  0429 12/09/20  0447 12/10/20  0420   AST 41* 25 23   ALT 63* 60* 50*   BILITOT 0.7 0.8 0.8   ALKPHOS 78 83 77     COAG  No results for input(s): INR in the last 72 hours. CARDIAC ENZYMES  No results for input(s): CKTOTAL, CKMB, CKMBINDEX, TROPONINI in the last 72 hours. U/A:  No results found for: NITRITE, COLORU, WBCUA, RBCUA, MUCUS, BACTERIA, CLARITYU, SPECGRAV, LEUKOCYTESUR, BLOODU, GLUCOSEU, AMORPHOUS    ABG  No results found for: KSM3XVK, BEART, L6NQKKGL, PHART, THGBART, PRB2WNM, PO2ART, VWM5FVC        Active Hospital Problems    Diagnosis Date Noted    Acute respiratory failure with hypoxia ([facility] [J96.01] 12/05/2020    Pneumonia due to COVID-19 virus [U07.1, J12.89] 12/05/2020    Acute on chronic respiratory failure with hypoxemia ([facility] [J96.21] 12/05/2020         ASSESSMENT/PLAN:    1. Acute respiratory failure  -heated high flow oxygen to keep sat 88-92%  - Secondary to COVID-19 pneumonia  -currently on 55l/min at 75% fio2    2. COVID-19  - Has received CP  -on Remdesivir  -on Decadron  - vitamins for immune support. -D-dimer is 763, Lovenox 40 BID    3. Diabetes mellitus  - sugar up from steroids  - on sliding scale insulin and Lantus  - normally on oral meds at home    4. Leukocytosis  - likely secondary to steroids. Will monitor. DVT Prophylaxis: lovenox Bid   Diet: DIET CARB CONTROL;  Code Status: Full Code  PT/OT Eval Status: when appropriate     Dispo - patient has been increased to ICU level of care. He is in [facility] ICU. 30 min of critical care time spent.          Eddie Amezquita MD

## 2020-12-11 NOTE — PROGRESS NOTES
Pt would benefit from reinforcement d/t hearing deficit. Barriers to Learning: Hearing  REQUIRES OT FOLLOW UP: Yes(TBD next level of care. May benefit from a shower chair for safety.)  Activity Tolerance  Activity Tolerance: Patient Tolerated treatment well;Treatment limited secondary to medical complications (free text)  Activity Tolerance: HIV215- 89% on arrival. During standing activity, up to 90%, then dropped to 81% after marching for 1 min. Recovered to 90% in ~1 min, then up to 93%. 2nd trial: 92% during activity, down to 85%; recovered to 94% in 3 min. 3rd trial starting 90%, decreasd to 74%. After 3 min, SPO2 to 93%. BP after activity. /73. (Previous BP 97/62). HR up to 120 - 126, but back down to low 90s, high 80s. 91% at end of session. Safety Devices  Safety Devices in place: Yes  Type of devices: All fall risk precautions in place; Left in chair;Nurse notified;Call light within reach; Chair alarm in place           Patient Diagnosis(es): The primary encounter diagnosis was Pneumonia due to organism. Diagnoses of COVID-19 and Hypoxemia were also pertinent to this visit. has a past medical history of Diabetes mellitus ([facility] and HTN (hypertension). has no past surgical history on file. Treatment Diagnosis: Decreased ADL status, endurance and functional mobility associated with acute respiratory failure, COVID+      Restrictions  Restrictions/Precautions  Restrictions/Precautions: Fall Risk, Isolation, Contact Precautions(COVID isolation)  Required Braces or Orthoses?: No  Position Activity Restriction  Spinal Precautions: [id number]- 89% on arrival. During standing activity, up to 90%, then dropped to 81% after marching for 1 min. Recovered to 90% in ~1 min, then up to 93%. 2nd trial: 92% during activity, down to 85%; recovered to 94% in 3 min. 3rd trial starting 90%, decreasd to 74%. After 3 min, SPO2 to 93%. BP after activity. /73. (Previous BP 97/62).  HR up to 120 - 126, but back down to goal 4: S/U for LB bathing/dressing with SPO2 90% or above  Short term goal 5: Pt to tolerate standing 2-3 min for ADL activity/functional mobility       Therapy Time   Individual Concurrent Group Co-treatment   Time In 1451         Time Out 1532         Minutes 41               Timed Code Treatment Minutes:  26 Minutes    Total Treatment Minutes:  41 min      PADMAJA [address], Ranj[address], Westley Tapia., OTR/L, [id number]

## 2020-12-11 NOTE — PROGRESS NOTES
filed at 12/11/2020 1351  Gross per 24 hour   Intake 720 ml   Output 1250 ml   Net -530 ml       Physical Exam Performed:    BP 97/62   Pulse 84   Temp 96.9 °F (36.1 °C) (Temporal)   Resp 16   Ht 6' (1.829 m)   Wt 257 lb 11.5 oz (116.9 kg)   SpO2 (!) 88%   BMI 34.95 kg/m²     General appearance: No apparent distress, appears stated age and cooperative. HEENT: Pupils equal, round, and reactive to light. Conjunctivae/corneas clear. Neck: Supple, with full range of motion. No jugular venous distention. Trachea midline. Respiratory:  Normal respiratory effort. Clear to auscultation, bilaterally without Rales/Wheezes/Rhonchi. Cardiovascular: Regular rate and rhythm with normal S1/S2 without murmurs, rubs or gallops. Abdomen: Soft, non-tender, non-distended with normal bowel sounds. Musculoskeletal: No clubbing, cyanosis or edema bilaterally. Full range of motion without deformity. Skin: Skin color, texture, turgor normal.  No rashes or lesions. Neurologic:  Neurovascularly intact without any focal sensory/motor deficits. Cranial nerves: II-XII intact, grossly non-focal.  Psychiatric: Alert and oriented, thought content appropriate, normal insight  Capillary Refill: Brisk,< 3 seconds   Peripheral Pulses: +2 palpable, equal bilaterally       Labs:   Recent Labs     12/09/20  0447 12/10/20  0420 12/11/20  0436   WBC 9.7 11.4* 12.7*   HGB 14.4 14.6 15.0   HCT 43.7 43.8 45.4    230 240     Recent Labs     12/09/20  0447 12/10/20  0420 12/11/20  0436   * 136 135*   K 4.7 5.0 4.6    106 104   CO2 19* 22 22   BUN 32* 33* 31*   CREATININE 0.8 0.8 0.8   CALCIUM 8.9 8.7 8.6     Recent Labs     12/09/20  0447 12/10/20  0420 12/11/20  0436   AST 25 23 18   ALT 60* 50* 50*   BILITOT 0.8 0.8 1.1*   ALKPHOS 83 77 81     No results for input(s): INR in the last 72 hours. No results for input(s): Cary Méndez in the last 72 hours.     Urinalysis:    No results found for: NITRU, [address], BACTERIA, Patito Rader, [address], [facility]    Radiology:  XR CHEST PORTABLE   Final Result   Bibasilar opacities could represent subsegmental atelectasis, bacterial or   atypical pneumonia                 Assessment/Plan:    Active Hospital Problems    Diagnosis    Acute respiratory failure with hypoxia ([facility] [J96.01]    Pneumonia due to COVID-19 virus [U07.1, J12.89]    Acute on chronic respiratory failure with hypoxemia ([address] [J96.21]     . Acute respiratory failure  -heated high flow oxygen to keep sat 88-92%  - Secondary to COVID-19 pneumonia  -currently on 55l/min at 75% fio2     2. COVID-19  - Has received CP  -on Remdesivir  -on Decadron  - vitamins for immune support. -D-dimer is 763, Lovenox 40 BID     3. Diabetes mellitus  - sugar up from steroids  - on sliding scale insulin and Lantus  - normally on oral meds at home     4. Leukocytosis  - likely secondary to steroids. Will monitor.              DVT Prophylaxis: lovenoxs  Diet: DIET CARB CONTROL;  Code Status: Full Code    PT/OT Eval Status: eval and treat     Dispo - [facility]  30 min critical care time spent.      Loulou Martinez MD

## 2020-12-11 NOTE — PROGRESS NOTES
Wt 257 lb 11.5 oz (116.9 kg)   SpO2 93%   BMI 34.95 kg/m²  I/O last 3 completed shifts: In: 720 [P.O.:720]  Out: 1250 [Urine:1250] No intake/output data recorded. Intake/Output Summary (Last 24 hours) at 12/11/2020 1704  Last data filed at 12/11/2020 1351  Gross per 24 hour   Intake 720 ml   Output 750 ml   Net -30 ml         Limited due to full PPE. CONSTITUTIONAL: No acute distress  HEENT: PERRL. Atraumatic. Normocephalic  NECK: Deferred due to full PPE precautions  CARDIOVASCULAR: NSR on monitor   RESPIRATORY & CHEST: NO use of accessory muscles of respiration  GASTROINTESTINAL & ABDOMEN: Deferred due to full PPE precautions  GENITOURINARY: Deferred. MUSCULOSKELETAL: Deferred due to full PPE precautions  SKIN OF BODY: Deferred due to full PPE precautions  PSYCHIATRIC EVALUATION: Deferred   HEMATOLOGIC/LYMPHATIC/ IMMUNOLOGIC: Deferred due to full PPE precautions  NEUROLOGIC: Deferred due to full PPE precautions                                LABS:    Recent Labs     12/09/20  0447 12/10/20  0420 12/11/20  0436   WBC 9.7 11.4* 12.7*   RBC 4.43 4.51 4.67   HGB 14.4 14.6 15.0   HCT 43.7 43.8 45.4   MCH 32.5 32.3 32.0   MCHC 33.0 33.2 33.0   RDW 13.7 13.7 13.7    230 240   MPV 10.2 10.9* 10.0   NEUTOPHILPCT 83.0 85.0 84.0   MONOPCT 6.0 8.0 6.0   BASOPCT 0.0 0.0 0.0     Recent Labs     12/09/20  0447 12/10/20  0420 12/11/20  0436   * 136 135*   K 4.7 5.0 4.6    106 104   ANIONGAP 8 8 9   CO2 19* 22 22   BUN 32* 33* 31*   CREATININE 0.8 0.8 0.8   CALCIUM 8.9 8.7 8.6   PROT 5.6* 5.8* 5.9*   BILITOT 0.8 0.8 1.1*   ALKPHOS 83 77 81   ALT 60* 50* 50*   AST 25 23 18   GLUCOSE 268* 297* 256*       IMPRESSION:  Acute hypoxic respiratory failure  COVID-19 pneumonia  Acute right rib fractures  Diabetes mellitus type 2       PLAN:  Patient continues to require heated high flow.  Heated high flow has been reduced to 85% 50 L/min. He is not in any respiratory distress.  Titrate FiO2 for saturation for sats of 90 to 92%.     D-dimer close to 800. Patient is not making any progress as far as hypoxia is concerned. I would recommend to switch to therapeutic Lovenox.       Continue Decadron 20 mg IV daily.     He received 5 days of Lavonna Yohana is status post 1 unit of convalescent plasma.     Recommend prone positioning in the bed as possible.     Keep blood sugar between 140 to 180 mg/dL. Increase Lantus to 30 units of grams twice daily     Adequate pulmonary toilet.  Out of bed in chair.  Encourage prone positioning in the bed.       iGlma Rankin MD  Pulmonary Critical Care and Sleep Medicine  12/11/2020, 5:04 PM    This note was completed using dragon medical speech recognition software. Grammatical errors, random word insertions, pronoun errors and incomplete sentences are occasional consequences of this technology due to software limitations. If there are questions or concerns about the content of this note of information contained within the body of this dictation they should be addressed with the provider for clarification.

## 2020-12-11 NOTE — PROGRESS NOTES
Physical Therapy    Facility/Department: [address]  Initial Assessment    NAME: Magda Casillas  : 1940  MRN: [id number]    Date of Service: 2020    Discharge Recommendations:  Magda Casillas scored a 17/24 on the AM-PAC short mobility form. Current research shows that an AM-PAC score of 17 or less is typically not associated with a discharge to the patient's home setting. Based on the patient's AM-PAC score and their current functional mobility deficits, it is recommended that the patient have 5-7 sessions per week of Physical Therapy at d/c to increase the patient's independence. At this time, this patient demonstrates the endurance, and/or tolerance for 3 hours of therapy each day, with a treatment frequency of 5-7x/wk. Please see assessment section for further patient specific details. If patient discharges prior to next session this note will serve as a discharge summary. Please see below for the latest assessment towards goals. 24 hour supervision or assist, 5-7 sessions per week   PT Equipment Recommendations  Equipment Needed: No    Assessment   Body structures, Functions, Activity limitations: Decreased functional mobility ; Decreased endurance  Assessment: Patient was functionally independent prior to admission. Presently, he is able to perform sit-stand and standing marching w/ CGA for safety. Medically, however, patient remains on 55L via Airvo saturating in the low 90's/high 80's at rest and dropping to low 80'/high 70's during activity w/ limited cardiovascular tolerance. Will follow during acute stay. Recommend 24 hour assist and continued therapy at d/c. Treatment Diagnosis: functional mobility deficits and cardiovascular limitations secondary to COVID  Prognosis: Fair  Decision Making: Medium Complexity  History: HTN, DM  Exam: ROM, MMT, functional mobility assessment  Clinical Presentation: Evolving.   PT Education: Goals;PT Role;Plan of Care;Transfer Training;Energy Conservation;General Safety; Functional Mobility Training  Patient Education: Patient verbalized understanding. Barriers to Learning: None. REQUIRES PT FOLLOW UP: Yes  Activity Tolerance  Activity Tolerance: Patient Tolerated treatment well;Patient limited by fatigue;Patient limited by endurance       Patient Diagnosis(es): The primary encounter diagnosis was Pneumonia due to organism. Diagnoses of COVID-19 and Hypoxemia were also pertinent to this visit. has a past medical history of Diabetes mellitus ([address] and HTN (hypertension). has no past surgical history on file. Restrictions  Restrictions/Precautions  Restrictions/Precautions: Fall Risk, Isolation, Contact Precautions(COVID isolation)  Required Braces or Orthoses?: No  Position Activity Restriction  Spinal Precautions: [id number]- 89% on arrival. During standing activity, up to 90%, then dropped to 81% after marching for 1 min. Recovered to 90% in ~1 min, then up to 93%. 2nd trial: 92% during activity, down to 85%; recovered to 94% in 3 min. 3rd trial starting 90%, decreasd to 74%. After 3 min, SPO2 to 93%. BP after activity. /73. (Previous BP 97/62). HR up to 120 - 126, but back down to low 90s, high 80s. 91% at end of session. Other position/activity restrictions: Patient admitted w/ SOB, found to be COVID +. Vision/Hearing  Vision: Within Functional Limits  Hearing: Exceptions to [facility]  Hearing Exceptions: Hard of hearing/hearing concerns;Bilateral hearing aid       Subjective  General  Chart Reviewed: Yes  Patient assessed for rehabilitation services?: Yes  Additional Pertinent Hx: HTN, DM  Response To Previous Treatment: Not applicable  Family / Caregiver Present: No  Diagnosis: COVID  Follows Commands: Within Functional Limits  General Comment  Comments: Patient sitting up in chair.   Subjective  Subjective: Patient reports no SOB at rest.  Pain Screening  Patient Currently in Pain: Denies  Vital Signs  Patient Currently in Pain: Denies  Patient Observation  Observations: [id number]- 89% on arrival. During standing activity, up to 90%, then dropped to 81% after marching for 1 min. Recovered to 90% in ~1 min, then up to 93%. 2nd trial: 92% during activity, down to 85%; recovered to 94% in 3 min. 3rd trial starting 90%, decreasd to 74%. After 3 min, SPO2 to 93%. BP after activity. /73. (Previous BP 97/62). HR up to 120 - 126, but back down to low 90s, high 80s. 91% at end of session. Orientation  Orientation  Overall Orientation Status: Within Normal Limits     Social/Functional History  Social/Functional History  Lives With: Son  Type of Home: House  Home Layout: Two level  Bathroom Shower/Tub: Tub/Shower unit, Walk-in shower  Bathroom Toilet: Standard  Bathroom Equipment: Grab bars in shower  ADL Assistance: [address] [place] [address]: Independent  Homemaking Responsibilities: Yes  Ambulation Assistance: Independent  Transfer Assistance: Independent  Active : Yes  Occupation: Retired  Leisure & Hobbies: [employer] daily  Additional Comments: Denies falls prior to this admission. Objective  Observation/Palpation  Posture: Good  Observation: Patient on Airvo 55L, FiO2 75%. AROM RLE (degrees)  RLE AROM: WNL  AROM LLE (degrees)  LLE AROM : WNL  Strength RLE  Strength RLE: WNL  Comment: 5/5 per MMT. Strength LLE  Strength LLE: WNL  Comment: 5/5 per MMT. Sensation  Overall Sensation Status: WNL  Bed mobility  Comment: Not observed. Patient sitting up in chair. Patient reports he was assisted to chair by RN. Transfers  Sit to Stand: Supervision  Stand to sit: Supervision  Ambulation  Ambulation?: No  Ambulation 1  Surface: level tile  Other Apparatus: O2(55L O2 via Airvo)  Assistance: Contact guard assistance  Comments: Patient connected to Airvo and multiple lines, unable to ambulate away from chair. Therefore, patient instructed on and performed in place marching x60 seconds x3 w/ 3:00 rest in between.   O2 dropped to approximately 85% on first two rounds and down to 78% on the last.  Patient able to recover to 92% O2 after 3 minutes sitting rest.     Balance  Posture: Good  Sitting - Static: Good  Sitting - Dynamic: Good  Standing - Static: Good  Standing - Dynamic: Good      Plan   Plan  Times per week: 3-5  Current Treatment Recommendations: Strengthening, Safety Education & Training, Endurance Training, Patient/Caregiver Education & Training, Functional Mobility Training, Transfer Training, Gait Training  Safety Devices  Type of devices: All fall risk precautions in place, Call light within reach, Chair alarm in place, Gait belt, Patient at risk for falls, Left in chair, Nurse notified  Restraints  Initially in place: No    AM-PAC Score  AM-PAC Inpatient Mobility Raw Score : 17 (12/11/20 1538)  AM-PAC Inpatient T-Scale Score : 42.13 (12/11/20 1538)  Mobility Inpatient CMS 0-100% Score: 50.57 (12/11/20 1538)  Mobility Inpatient CMS G-Code Modifier : CK (12/11/20 1538)     Goals  Short term goals  Time Frame for Short term goals: Discharge. Short term goal 1: Patient will perform bed mobility independently. Short term goal 2: Patient will perform bed-chair transfer MOD I. Short term goal 3: Patient will ambulate 48' MOD I. Patient Goals   Patient goals : Return home.      Therapy Time   Individual Concurrent Group Co-treatment   Time In       8109   Time Out       1532   Minutes       41   Timed Code Treatment Minutes: [address] [place], [place], DPT, ATC-R [id number]

## 2020-12-12 NOTE — PROGRESS NOTES
(1.829 m)   Wt 260 lb 12.9 oz (118.3 kg)   SpO2 94%   BMI 35.37 kg/m²  I/O last 3 completed shifts: In: 240 [P.O.:240]  Out: 1550 [UPLUO:9500] I/O this shift:  In: -   Out: 750 [Urine:750]      Intake/Output Summary (Last 24 hours) at 12/12/2020 1645  Last data filed at 12/12/2020 1526  Gross per 24 hour   Intake 240 ml   Output 2300 ml   Net -2060 ml         Limited due to full PPE. CONSTITUTIONAL: No acute distress  HEENT: PERRL. Atraumatic. Normocephalic  NECK: Deferred due to full PPE precautions  CARDIOVASCULAR: NSR on monitor   RESPIRATORY & CHEST: NO use of accessory muscles of respiration  GASTROINTESTINAL & ABDOMEN: Deferred due to full PPE precautions  GENITOURINARY: Deferred.    MUSCULOSKELETAL: Deferred due to full PPE precautions  SKIN OF BODY: Deferred due to full PPE precautions  PSYCHIATRIC EVALUATION: Deferred   HEMATOLOGIC/LYMPHATIC/ IMMUNOLOGIC: Deferred due to full PPE precautions  NEUROLOGIC: Deferred due to full PPE precautions                                LABS:    Recent Labs     12/10/20  0420 12/11/20  0436 12/12/20 0418   WBC 11.4* 12.7* 11.6*   RBC 4.51 4.67 4.73   HGB 14.6 15.0 15.3   HCT 43.8 45.4 45.7   MCH 32.3 32.0 32.4   MCHC 33.2 33.0 33.5   RDW 13.7 13.7 13.7    240 243   MPV 10.9* 10.0 10.3   NEUTOPHILPCT 85.0 84.0 78.0   MONOPCT 8.0 6.0 11.0   BASOPCT 0.0 0.0 0.0     Recent Labs     12/10/20  0420 12/11/20  0436 12/12/20  0418    135* 135*   K 5.0 4.6 4.7    104 104   ANIONGAP 8 9 10   CO2 22 22 21   BUN 33* 31* 29*   CREATININE 0.8 0.8 0.8   CALCIUM 8.7 8.6 8.6   PROT 5.8* 5.9* 5.5*   BILITOT 0.8 1.1* 1.2*   ALKPHOS 77 81 83   ALT 50* 50* 47*   AST 23 18 21   GLUCOSE 297* 256* 247*         IMAGING  Acute hypoxic respiratory failure  COVID-19 pneumonia  Acute right rib fractures  Diabetes mellitus type 2        PLAN:  Patient continues to require heated high flow ranging from 75 to 80% FiO2.  Titrate FiO2 for saturation for sats of 88 to 92%.     D-dimer close to 800. Patient is not making any progress as far as hypoxia is concerned. Continue therapeutic Lovenox.       Continue Decadron 20 mg IV daily, day 5.     He received 5 days of Vernida Racer is status post 1 unit of convalescent plasma.     Recommend prone positioning in the bed as possible.     Keep blood sugar between 140 to 180 mg/dL.        Adequate pulmonary toilet.  Out of bed in chair.  Encourage prone positioning in the bed. Lynn Almeida MD  Pulmonary Critical Care and Sleep Medicine  12/12/2020, 4:45 PM    This note was completed using dragon medical speech recognition software. Grammatical errors, random word insertions, pronoun errors and incomplete sentences are occasional consequences of this technology due to software limitations. If there are questions or concerns about the content of this note of information contained within the body of this dictation they should be addressed with the provider for clarification.

## 2020-12-12 NOTE — PROGRESS NOTES
Patient updated on plan of care. Assessment completed at this time. Vital signs stable. Patient remains on monitor with audible alarms. Bed in lowest position and call light within reach. Educated on use of call light. Please see the flowsheet. Lines remains patent. Patient states no further needs at this time. Will continue to monitor.

## 2020-12-12 NOTE — PROGRESS NOTES
Dozing when entered room. Airvo settings unchanged. Voiding per urinal. Denies needs at present. Will continue to monitor.

## 2020-12-12 NOTE — PROGRESS NOTES
filed at 12/12/2020 0406  Gross per 24 hour   Intake 480 ml   Output 1550 ml   Net -1070 ml       Physical Exam Performed:    BP (!) 93/58   Pulse 89   Temp 96.9 °F (36.1 °C) (Temporal)   Resp 16   Ht 6' (1.829 m)   Wt 260 lb 12.9 oz (118.3 kg)   SpO2 (!) 86%   BMI 35.37 kg/m²     General appearance: No apparent distress, appears stated age and cooperative. HEENT: Pupils equal, round, and reactive to light. Conjunctivae/corneas clear. Neck: Supple, with full range of motion. No jugular venous distention. Trachea midline. Respiratory: rales at the bases bilat  Cardiovascular: Regular rate and rhythm with normal S1/S2 without murmurs, rubs or gallops. Abdomen: Soft, non-tender, non-distended with normal bowel sounds. Musculoskeletal: No clubbing, cyanosis or edema bilaterally. Full range of motion without deformity. Skin: Skin color, texture, turgor normal.  No rashes or lesions. Neurologic:  Neurovascularly intact without any focal sensory/motor deficits. Cranial nerves: II-XII intact, grossly non-focal.  Psychiatric: Alert and oriented, thought content appropriate, normal insight  Capillary Refill: Brisk,< 3 seconds   Peripheral Pulses: +2 palpable, equal bilaterally       Labs:   Recent Labs     12/10/20  0420 12/11/20  0436 12/12/20  0418   WBC 11.4* 12.7* 11.6*   HGB 14.6 15.0 15.3   HCT 43.8 45.4 45.7    240 243     Recent Labs     12/10/20  0420 12/11/20  0436 12/12/20 0418    135* 135*   K 5.0 4.6 4.7    104 104   CO2 22 22 21   BUN 33* 31* 29*   CREATININE 0.8 0.8 0.8   CALCIUM 8.7 8.6 8.6     Recent Labs     12/10/20  0420 12/11/20  0436 12/12/20 0418   AST 23 18 21   ALT 50* 50* 47*   BILITOT 0.8 1.1* 1.2*   ALKPHOS 77 81 83     No results for input(s): INR in the last 72 hours. No results for input(s): Reyne Lao in the last 72 hours.     Urinalysis:    No results found for: Oumar Parent, BACTERIA, RBCUA, BLOODU, [address], [address]    Radiology:  XR CHEST PORTABLE   Final Result   Bibasilar opacities could represent subsegmental atelectasis, bacterial or   atypical pneumonia                 Assessment/Plan:    Active Hospital Problems    Diagnosis    Acute respiratory failure with hypoxia ([facility] [address] [J96.01]    Pneumonia due to COVID-19 virus [U07.1, J12.89]    Acute on chronic respiratory failure with hypoxemia ([facility] [address] [J96.21]     . Acute respiratory failure  -heated high flow oxygen to keep sat 88-92%  - Secondary to COVID-19 pneumonia  -currently on 50  l/min at 80 % fio2     2. COVID-19  - Has received CP  -on Remdesivir  -on Decadron  - vitamins for immune support. -D-dimer is 763, Lovenox 40 BID     3. Diabetes mellitus  - sugar up from steroids  - on sliding scale insulin and Lantus  - normally on oral meds at home     4. Leukocytosis  - likely secondary to steroids. Will monitor.              DVT Prophylaxis: lovenoxs  Diet: DIET CARB CONTROL;  Code Status: Full Code    PT/OT Eval Status: eval and treat     Dispo - CCC  30 min critical care time spent.      Uriel Small MD

## 2020-12-12 NOTE — PROGRESS NOTES
Patient assisted to the chair. Pillow support placed behind back. Bed linen change completed. IV decadron completed. Patient remains on monitor with audible alarms. Oxygen saturation began to decrease upon ambulation. Patient denies shortness of breath or increase work of breathing at this time. Patient oxygen saturation within normal limits in less than two minutes with no further signs of distress. Patient talking on phone to family at this time, Call light placed within patients reach with the urinal for patient to void. IV patent and flushed. Denies further needs. Will continue to monitor.

## 2020-12-12 NOTE — PROGRESS NOTES
PM assessment complete and documented. Meds given per MAR. Assisted pt to bed. Tolerated well. Per pt, unable to lie prone but currently sidelying. Voiding per urinal. No needs or concerns expressed. Will continue to monitor.

## 2020-12-12 NOTE — PROGRESS NOTES
Patient remains in chair. Voiding per urinal with no signs of complication. Patient appears to be stable. Will continue to monitor.

## 2020-12-12 NOTE — PROGRESS NOTES
No changes noted in assessment. Sleeping between care. Awakens easy. Remains on AirVo oxygen with settings as charted. No needs expressed. Will continue to monitor.

## 2020-12-13 NOTE — PROGRESS NOTES
No changes noted. Pt continues to sleep between care. Denies needs at present. Airvo increased to 83%. Will continue to monitor.

## 2020-12-13 NOTE — PROGRESS NOTES
hours) at 12/13/2020 1458  Last data filed at 12/13/2020 1253  Gross per 24 hour   Intake --   Output 1725 ml   Net -1725 ml       Physical Exam Performed:    /67   Pulse 93   Temp 97.1 °F (36.2 °C) (Temporal)   Resp 20   Ht 6' (1.829 m)   Wt 254 lb 13.6 oz (115.6 kg)   SpO2 92%   BMI 34.56 kg/m²     General appearance: No apparent distress, appears stated age and cooperative. HEENT: Pupils equal, round, and reactive to light. Conjunctivae/corneas clear. Neck: Supple, with full range of motion. No jugular venous distention. Trachea midline. Respiratory: rales at the bases bilat  Cardiovascular: Regular rate and rhythm with normal S1/S2 without murmurs, rubs or gallops. Abdomen: Soft, non-tender, non-distended with normal bowel sounds. Musculoskeletal: No clubbing, cyanosis or edema bilaterally. Full range of motion without deformity. Skin: Skin color, texture, turgor normal.  No rashes or lesions. Neurologic:  Neurovascularly intact without any focal sensory/motor deficits. Cranial nerves: II-XII intact, grossly non-focal.  Psychiatric: Alert and oriented, thought content appropriate, normal insight  Capillary Refill: Brisk,< 3 seconds   Peripheral Pulses: +2 palpable, equal bilaterally       Labs:   Recent Labs     12/11/20 0436 12/12/20 0418 12/13/20 0426   WBC 12.7* 11.6* 18.0*   HGB 15.0 15.3 14.9   HCT 45.4 45.7 45.0    243 251     Recent Labs     12/11/20 0436 12/12/20 0418 12/13/20 0426   * 135* 133*   K 4.6 4.7 4.6    104 103   CO2 22 21 20*   BUN 31* 29* 40*   CREATININE 0.8 0.8 0.8   CALCIUM 8.6 8.6 8.7     Recent Labs     12/11/20 0436 12/12/20 0418 12/13/20 0426   AST 18 21 16   ALT 50* 47* 40   BILITOT 1.1* 1.2* 1.0   ALKPHOS 81 83 78     No results for input(s): INR in the last 72 hours. No results for input(s): Margette Dec in the last 72 hours.     Urinalysis:    No results found for: Perfecto Counter, [address], BACTERIA, RBCUA, BLOODU, [address],

## 2020-12-13 NOTE — PROGRESS NOTES
No changes noted in assessment. Sleeping between care. Airvo setting noted. Slept side lying for about an hour or so. No needs expressed. Will continue to monitor.

## 2020-12-14 NOTE — CARE COORDINATION
Discharge planning note:    Remains in ICU on vent at this time and receiving IV Decadron, Levophed gtt, Protonix gtt, Maximiliano-synephrine gtt & Vasopressin gtt. According to pulmonary notes patient became critically ill overnight. Dr. Marysol Andrade has spoken with family and patient has been changed to [facility].     Made [facility] referral earlier this AM.    Debbie Leavitt RN BSN  Case Management  [phone]

## 2020-12-14 NOTE — PROGRESS NOTES
Pt reports feeling extremely hot and unable to get himself comfortable. Temp is 96.2. HR goes up to 130 down to 90. Have been giving him ice bag and a electric fan. Will continue to monitor.

## 2020-12-14 NOTE — PROGRESS NOTES
[facility] NP came to see and assessed pt. Pt sat was 95%, not in any distress, denies SOB. VSS. Monica Crawford said to start the Cardizem drip but pt BP was not stable enough to start Cardizem drip. Monica Crawford said to give pt sometimes to recover. No new orders.

## 2020-12-14 NOTE — PROGRESS NOTES
Message sent to Todd Lee: \"pt sat O2 is 85-87 max on Airvo. I gave him bolus Cardizem and his HR is 89 now. I don't think I have to start Cardizem drip on him. But pt still saying not doing better. Do you want to order Bipap and ABG on him? Pt also c/o SOB. Please advise. thank you.

## 2020-12-14 NOTE — PROGRESS NOTES
[facility] called with time of cardiac arrest, Milena Rizvi stated they are not following.  Reference # [id number]

## 2020-12-14 NOTE — PROCEDURES
Procedure- Emergency right femoral vein central line placement. Indication- Hemorrhagic shock. EBL- Minimal.    Informed consent- Emergency procedure. Anesthetic- 1% lidocaine. Procedure details- under all aseptic precautions, the right femoral vein was accessed in the first attempt with the #20 needle and a guide wire was introduced into the vein without any difficulty. The catheter was advanced into the femoral vein without any difficulty after dilating the skin tract. Ports aspirated and flushed. Sterile suture applied and sterile dressing given.

## 2020-12-14 NOTE — PROGRESS NOTES
Advanced Care Planning Note. Purpose of Encounter: Advanced care planning due to multiorgan failure, hemorrhagic shock, respiratory failure, Covid pneumonia  Parties In Attendance: the patient's daughter   Decisional Capacity: Yes  Subjective: Patient is intubated, unresponsive in hemorrhagic shock  Objective: Cr 1.4  Goals of Care Determination: Patient has developed multiorgan failure with poor mentation on the background of severe respiratory failure due to Covid pneumonia  Plan:  personally discussed  prognosis, expected outcome with or without ongoing aggressive treatments and the options for de-escalation of care. Assessed patient specific goals and addressed the best way to achieve them. Did discuss this with patient's daughter Ms. Harry Shankar over the phone [[phone]]. She is going to discuss with her family. They are leaning towards making the patient DNR CC. Code Status: Full code            Time spent on Advanced care Plannin minutes  Advanced Care Planning Documents: Completed advanced directives on chart, children share the POA.         Blossom Rucker MD  Pulmonary Critical Care and Sleep Medicine  2020, 10:29 AM

## 2020-12-14 NOTE — SIGNIFICANT EVENT
Hospitalist    Notified by bedside nurse patient was feeling hot and unable to get comfortable for over and hour. EKG completed A-fib . /59,  Afebrile. He denied chest pain. A/P  A-fib RVR   Pt will be given Cardizem 10mg IV bolus and started on cardizem gtt.      Check labs now, serial troponin,  Echo in am.      He is currently on Lovenox 1mg/kg BID  Which will continue and having primary  physician address needs for further anticoagulation for A-fib   Cardiology consult     Respiratory Failure with COIVD 19       Spencer Lima NP

## 2020-12-14 NOTE — PROGRESS NOTES
Pt sats dropped to 83% on vent 100%, +8. Pt taken off vent and bag mask ventilation provided with a peep valve until pt saturations increased 92-93%. Will continue to monitor.

## 2020-12-14 NOTE — PROGRESS NOTES
Direct pt to turn on sides or prone. Pt said he is unable to prone since he can't breath and it's hurt to be on the stomach since he has some broken ribs. No pain as of now. Pt ok to alternate on his sides and his back.

## 2020-12-14 NOTE — PROGRESS NOTES
Referral from nursing for family support at end of life and family's decision to switch to comfort care s/p withdrawal of ventilator support. Family at bedside, saying goodbyes, sharing stories about patient. Family at bedside at time of death. Support provided; notified [facility] at the request of daughter, Candida Bautista. Spoke with Samir Iyer at [facility], [phone]. RN aware of same.

## 2020-12-14 NOTE — OP NOTE
[facility] [place]  Endoscopy Procedure Note      Charlotte Root  : 1940  MR# [id number]  [phone] (home)   Primary MD = Hortencia Trejo MD        DATE:  20    PROCEDURE(S) PERFORMED:  1. EGD with submucosal injection of Epi  2. EGD with Gold probe (bipolar) cauterization  3. EGD with biopsies    POST-OPERATIVE DIAGNOSIS(ES):  1. Normal esophagus  2. Large amount of retained blood in stomach, suctioned  3. Large deep antral ulcer with active pulsatile bleed status post hemostasis using epinephrine and Gold probe cautery. 4. Normal duodenum  5. Random gastric biopsies obtained      INDICATIONS:  Charlotte Root is a 78 y.o. male is undergoing an EGD for further evaluation of his massive GI bleed. COVID infection on anticoagulation, emergent intubation, significant pressor requirement      CONSENT:  Informed consent was obtained. The anesthesia and procedure along with the risks, benefits, and alternatives were discussed by myself and the nursing staff to the satisfaction of the patient/family with ample opportunity to ask and answer questions. We discussed the risks of bleeding, perforation, anesthesia reaction, as well as the alternatives, and possibility of missed lesions. ASA - IV  Mallampati - intubated    DETAILS OF PROCEDURE:  The patient was placed kept in the supine position. The patient was already sedated by ICU service. The Olympus endoscope was inserted into the oropharynx and guided under direct vision into the esophagus, stomach, and duodenum. · The visualized portions of the esophagus were normal  · There was no evidence of Sotomayor's esophagus in the distal esophagus. · Careful examination of the stomach including views of the body and antrum revealed large amount of retained blood, suctioned  · Large deep ulcer (at least 3 cm in diameter) was seen in the antrum with active pulsatile bleed.   Epinephine was injected circumferentially around the ulcer (6 ml in total), which slowed down the bleeding. The bleeding vessel was visualized within the ulcer bed. Gold probe cautery was used on the vessel to achieve hemostasis. No bleeding noted after at least 5 minutes of monitoring  · Retroflexed view of the gastric cardia and fundus were obscured by retained blood clot, no active bleeding appreciated  · Random gastric biopsies were taken to evaluate for Helicobacter pylori infection. · The pylorus was then intubated and the duodenal bulb was examined and normal.  · The second portion of the duodenum was normal.  · At this point, the insufflated air was withdrawn and the endoscope was removed from the patient. The patient tolerated the procedure very well. There were no apparent complications. Blood loss - minimal.      RECOMMENDATIONS:    1. Resume preop orders   2. Massive GI bleed secondary to large deep gastric ulcer status post hemostasis using epinephrine injection and Gold probe cauterization  3. Monitor Hgb and transfuse as needed per primary service   4. Replace OG tube and monitor for acute bloody output that would suggest re-bleed and need for repeat EGD  5. Continue Protonix gtt  6. Follow up pathology for potential Helicobacter pylori infection, will notify patient with results and further plan if necessary      Please do not hesitate to call with questions/concerns or if  I can be of further assistance in the care of this patient.         Clayton Wong MD  [facility] [place]  [address] [place] [address],  [address], [address]  [phone] (phone)  [phone] (fax)

## 2020-12-14 NOTE — PROGRESS NOTES
Pt , lower dentures inserted into mouth. Pt family member [address] [place] [address] Rd took pt belongings with her.

## 2020-12-14 NOTE — PROCEDURES
Procedure- urgent right femoral arterial line placement. Indication- Severe hemorrhagic shock. EBL- Minimal.    Procedure details- under all aseptic precautions, the right femoral artery was accessed in the first attempt with the #20 needle and a guide wire was introduced into the artery. without any difficulty. The catheter was advanced into the femoral artery. Sterile suture applied and sterile dressing given.

## 2020-12-14 NOTE — PROGRESS NOTES
Message sent to Michell Velazquez NP: [place] [address] or Facility: [facility] From: Jersey Desouza RE: Barbara Donis RM: [id number] pt is here for Covid positive. pt is having hot flash as pt c/o feeling so hot and can not get comfortable for 1 hr. WE did STAT EKG it shows afib RVR. HR now is 133. up and down to 110. /59. pt denies any pain or palpitation. please advise! Thank you.  Need Callback: NO CALLBACK REQ 5C STEP DOWN\"

## 2020-12-14 NOTE — PROGRESS NOTES
Cardizem bolus IV 10mg given, HR is 88. No need to start on Cardizem drip yet. Pt reports feeling less hot but not feeling better.

## 2020-12-14 NOTE — CONSULTS
- Sock liver:    New diagnosis   Elevated liver enzymes. Secondary to shock. Discussed with nursing staff. No further EP recommendation. Will sign off. Call if there are questions. Diagnostic studies:   ECG: Afib, RBBB, LAFB  CXR:   Bibasilar opacities could represent subsegmental atelectasis, bacterial or   atypical pneumonia       I independently reviewed the cardiac diagnostic studies, ECG and relevant imaging studies. Physical Examination:  Vitals:    20 0653   BP: (!) 125/57   Pulse: 91   Resp: 16   Temp: (!) 85.3 °F (29.6 °C)   SpO2: 94%      In: 10 [I.V.:10]  Out: 800    Wt Readings from Last 3 Encounters:   20 254 lb 13.6 oz (115.6 kg)     Temp  Av.1 °F (35.1 °C)  Min: 85.3 °F (29.6 °C)  Max: 97.6 °F (36.4 °C)  Pulse  Av.2  Min: 88  Max: 162  BP  Min: 91/72  Max: 125/57  SpO2  Av.5 %  Min: 87 %  Max: 94 %  FiO2   Av.2 %  Min: 95 %  Max: 100 %    Intake/Output Summary (Last 24 hours) at 2020 0810  Last data filed at 2020 3682  Gross per 24 hour   Intake 10 ml   Output 1100 ml   Net -1090 ml     I independently reviewed all cardiac tracing from cardiac telemetry. Due to the current efforts to prevent transmission of COVID-19 and also the need to preserve PPE for other caregivers, a face-to-face encounter with the patient was not performed. That being said, all relevant records and diagnostic tests were reviewed, including laboratory results and imaging. Please reference any relevant documentation elsewhere. Care will be coordinated with the primary service.       Scheduled Meds:   pantoprazole (PROTONIX) bolus  80 mg Intravenous Once    rocuronium        etomidate        EPINEPHrine        EPINEPHrine        sodium chloride  20 mL Intravenous Once    sodium chloride  20 mL Intravenous Once    dexamethasone (DECADRON) IVPB  10 mg Intravenous Q24H    metoprolol tartrate  25 mg Oral BID    insulin glargine  30 Units Subcutaneous BID    vitamin C  500 mg Oral Daily    Vitamin D  1,000 Units Oral Daily    zinc sulfate  50 mg Oral Daily    [Held by provider] insulin lispro  0-12 Units Subcutaneous TID WC    [Held by provider] insulin lispro  0-6 Units Subcutaneous Nightly    sodium chloride flush  10 mL Intravenous 2 times per day    sodium chloride flush  10 mL Intravenous 2 times per day    tamsulosin  0.4 mg Oral Daily    atorvastatin  40 mg Oral Nightly     Continuous Infusions:   pantoprozole (PROTONIX) infusion      vasopressin (Septic Shock) infusion      EPINEPHrine infusion      norepinephrine      dextrose       PRN Meds:.perflutren lipid microspheres, sodium chloride flush, sodium chloride, sodium chloride flush, acetaminophen **OR** acetaminophen, polyethylene glycol, promethazine **OR** ondansetron, glucose, dextrose, glucagon (rDNA), dextrose, HYDROcodone 5 mg - acetaminophen     Review of System:  Unable to obtain due to patient condition       Prior to Admission medications    Medication Sig Start Date End Date Taking?  Authorizing Provider   SITagliptin (JANUVIA) 100 MG tablet Take 100 mg by mouth daily   Yes Historical Provider, MD   atorvastatin (LIPITOR) 40 MG tablet Take 40 mg by mouth daily   Yes Historical Provider, MD   lisinopril (PRINIVIL;ZESTRIL) 10 MG tablet Take 10 mg by mouth daily   Yes Historical Provider, MD   meloxicam (MOBIC) 15 MG tablet Take 15 mg by mouth daily   Yes Historical Provider, MD   pioglitazone (ACTOS) 15 MG tablet Take 15 mg by mouth 2 times daily   Yes Historical Provider, MD   metFORMIN (GLUCOPHAGE) 850 MG tablet Take 850 mg by mouth 2 times daily (with meals)   Yes Historical Provider, MD   tamsulosin (FLOMAX) 0.4 MG capsule Take 0.4 mg by mouth 2 times daily   Yes Historical Provider, MD   aspirin 81 MG chewable tablet Take 81 mg by mouth daily   Yes Historical Provider, MD       Past Medical History:   Diagnosis Date    Diabetes mellitus ([facility] [address]     HTN (hypertension)         History reviewed. No pertinent surgical history. No Known Allergies    Social History:  Reviewed. reports that he has quit smoking. He has never used smokeless tobacco. He reports previous alcohol use. He reports that he does not use drugs. Family History:  Reviewed. Reviewed. No family history of SCD. Relevant and available labs, and cardiovascular diagnostics reviewed. Reviewed. Recent Labs     12/12/20  0418 12/13/20  0426 12/14/20  0612   * 133* 139   K 4.7 4.6 5.8*    103 98*   CO2 21 20* 14*   BUN 29* 40* 100*   CREATININE 0.8 0.8 1.8*     Recent Labs     12/13/20  0426 12/14/20  0430 12/14/20  0612   WBC 18.0* 33.4* 45.9*   HGB 14.9 10.1* 12.5*   HCT 45.0 33.4* 41.1   MCV 97.1 104.7* 98.7    256 317     Estimated Creatinine Clearance: 42 mL/min (A) (based on SCr of 1.8 mg/dL (H)). No results found for: BNP    I independently reviewed all cardiac tracing from cardiac telemetry. Total critical care time was 35 minutes, for caring of this patient with life-threatening condition and organ failure, excluding separately reportable procedures. Services, included in critical care time were chart data review, documentation time, obtaining info from patient, review of nursing notes, and vital sign assessment and management of the patient. All questions and concerns were addressed to the patient/family. Alternatives to my treatment were discussed. I have discussed the above stated plan and the patient verbalized understanding and agreed with the plan. NOTE: This report was transcribed using voice recognition software. Every effort was made to ensure accuracy, however, inadvertent computerized transcription errors may be present.      Kirstin Ghosh MD, MPH  [place] [address]   Office: [phone]

## 2020-12-14 NOTE — PROGRESS NOTES
Teaching / education initiated regarding perioperative experience, expectations, and pain management during stay. Reviewed patient's medical and surgical history in electronic record. All questions regarding procedure answered. Scope number and equipment verified using a two person system. Consent obtained by RN via daughter on phone.     Electronically signed by Kari Roque RN on 12/14/2020 at 6:13 AM

## 2020-12-14 NOTE — PROGRESS NOTES
Message sent to hospitalist: [place] [id number] or Facility: [facility] From: Joanna Bonds RE: Benny Siddiqi RM: [id number] pt is here for COvid positive. pt is c/o feeling so hot and does not feeling well. . BP 97/48 map 61. please advise. Thanks.  Need Callback: NO CALLBACK REQ 5C STEP DOWN NEW ADMISSION URGENT\"

## 2020-12-14 NOTE — PROGRESS NOTES
Comprehensive Nutrition Assessment    Type and Reason for Visit:  Reassess    Nutrition Recommendations/Plan:   Recommend EN when hemodynamically stable. Will continue to monitor for changes in status. Nutrition Assessment:  Pt is at risk for nutrition compromise due to ventilation and h/o of wt loss. Pt reported losing ~30 lbs with UBW of 287. Recorded PO intake of % meals prior to ventilation. Pt currently not hemodynamically stable enough for EN d/t need for pressors. Will monitor for changes in status. Malnutrition Assessment:  Malnutrition Status:  No malnutrition      Estimated Daily Nutrient Needs:  Energy (kcal):  1552-4359; Weight Used for Energy Requirements:  Current(116 kg)     Protein (g):  162 g; Weight Used for Protein Requirements:  Ideal(81 kg (2 g/kg))          Nutrition Related Findings:  no edema noted. Wounds:  None       Current Nutrition Therapies:    Diet NPO Effective Now    Anthropometric Measures:  · Height: 6' (182.9 cm)  · Current Body Weight: 254 lb (115.2 kg)   · Admission Body Weight: 287 lb (130.2 kg)    · Ideal Body Weight: 178 lbs; % Ideal Body Weight 142.7 %   · BMI: 34.4  · Adjusted Body Weight:   No Adjustment   · BMI Categories: Obese Class 1 (BMI 30.0-34. 9)       Nutrition Diagnosis:   · Inadequate energy intake related to impaired respiratory function as evidenced by intubation    Nutrition Interventions:   Food and/or Nutrient Delivery:  Continue NPO  Nutrition Education/Counseling:  No recommendation at this time, Education not appropriate   Coordination of Nutrition Care:  Continue to monitor while inpatient    Goals:  Pt will tolerate diet advancement or EN when hemodynamically stable. Nutrition Monitoring and Evaluation:   Behavioral-Environmental Outcomes:  None Identified   Food/Nutrient Intake Outcomes:  None Identified  Physical Signs/Symptoms Outcomes:  Biochemical Data, Hemodynamic Status, Weight, GI Status     Discharge Planning:     Too soon to determine     Electronically signed by Martine Matthews RD, LD on 12/14/20 at 10:34 AM EST    Contact: [phone]

## 2020-12-14 NOTE — DISCHARGE SUMMARY
[facility]  Death/Discharge Summary    Abdulaziz Dominguez       :  1940              MRN:  [id number]    Admit date:  2020    Discharge date:  2020    Admitting Physician:  Deb Collier MD  Discharge Physician: Tina Woodard MD        Admission Condition:  stable    Discharged Condition:      History of Present Illness:   Chuck Blackwell a 78 y. o. male who presents here to the emergency department, the patient states that on Thursday, he fell in the shower, and landed on his right side.  Although his chest x-ray was unremarkable the assumption is that he broke some ribs.  He saw his family doctor who ordered a Covid test and it was positive. [facility] has continued to have shortness of breath, and today has had worsening oxygen saturation rate, decreased appetite, decreased smell and taste, congestion, runny nose generally not feeling well.  He denies any abdominal pain.     He was transferred to ICU due to increased oxygen need.      Currently on heated high flow oxygen 50 liters a minute at 80% fio2 he's about 88-92 % which is where we want him  He looks comfortable. On  the patient suffered a massive GI hemorrhage and  Hemorrhagic shock. He had emergent intubation, central line and a-line placement. EGD found a large deep ulcer with actively pumping blood vessel. It was injected and bleeding slowed but not before damage was done. The patient was doing poorly requiring multiple pressors and was unresponsive. Family was contracted and they came in to see the patient. After seeing him family requested that care be withdrawn. Discharge Physical Exam:    Called by nurse that patient had become asystole on telemetry. Patient seen and examined. No palpable pulse. Pupils fixed and dilated. No cardiac or pulmonary activity. Patient pronounced dead.      Time of Death: 11:45 am     Cause of Death: hemorrhagic shock secondary to massive GI bleed Disposition:   [facility]    Time spent on Discharged is 30 minutes      Signed:  Cornelius Shi MD  12/14/2020, 12:03 PM

## 2020-12-14 NOTE — PROGRESS NOTES
12/14/20 0922   Vent Patient Data   Plateau Pressure 20 [id number]   Static Compliance 32.6 mL/cmH2O   Dynamic Compliance 28.7 mL/cmH2O

## 2020-12-14 NOTE — PROGRESS NOTES
Initial shift assessment completed, see complex assessment in doc flowsheet. Pt on ventilator, unresponsive. Lungs diminished, monitor A-fib. Lott draining yellow, clear urine. A-line in place in R fem, calibrated and zeroed. Vasopressin at 0.04, levophed at 70. Pt has 1 unit of PRBC's infusing, see flowsheets. AM lactic acid elevated, MD notified.

## 2020-12-14 NOTE — PROGRESS NOTES
[facility] and spoke with Domenica Hurtdao, call with time of cardiac death and any updates.

## 2020-12-14 NOTE — PROGRESS NOTES
Clinical Pharmacy Note     Lisinopril placed on hold by Dr. Neisha Tillman. Order discontinued per protocol. Please assess and reorder when appropriate. Thank you for allowing us to participate in the care of this patient.     Tisha Zayas, PharmD, BCPS   x [phone]

## 2020-12-14 NOTE — CONSULTS
[facility] [place]  Consultation Note        Subjective:       Patient is a 78 y.o.  male admitted 12/5/2020 with Acute respiratory failure with hypoxia (HCC) [J96.01]  Acute respiratory failure with hypoxia (HCC) [J96.01]  Acute respiratory failure with hypoxia (HCC) [J96.01]  Acute on chronic respiratory failure with hypoxemia (HCC) [J96.21]  Acute on chronic respiratory failure with hypoxemia ([address] [J96.21] who is seen in consult for GI bleed. History of COVID 19 infection on anticoagulation (Lovenox) with massive GI bleed this morning causing hemorrhagic shock. Aggressive fluid/pRBC resuscitation, stabilized however significant pressors. Intubated. Past Endoscopic History  Unable to obtain      Imaging Studies:                  Ultrasound:  CT-scan of abdomen and pelvis:       Assessment:     Principal Problem:    Acute respiratory failure with hypoxia (HCC)  Active Problems:    Pneumonia due to COVID-19 virus    Acute on chronic respiratory failure with hypoxemia (HCC)  Resolved Problems:    * No resolved hospital problems. *    GI bleed resulting in hemorrhagic shock - broad differential including peptic ulcer disease, vascular malformation, Dieulafoy lesion, malignancy, or other. COVID 19 infection - intubated    Recommendations:   - emergent EGD to control bleeding  - aggressive resuscitation per ICU        Tiffanie Hartman MD  [facility] [address] [address]  [address] [address],[address]  [phone] (phone)  [phone] (fax)          Past Medical History:   Diagnosis Date    Diabetes mellitus ([address]     HTN (hypertension)       History reviewed. No pertinent surgical history.        Medications Prior to Admission: SITagliptin (JANUVIA) 100 MG tablet, Take 100 mg by mouth daily  atorvastatin (LIPITOR) 40 MG tablet, Take 40 mg by mouth daily  lisinopril (PRINIVIL;ZESTRIL) 10 MG tablet, Take 10 mg by mouth daily  meloxicam (MOBIC) 15 MG tablet, Take 15 mg by mouth daily  pioglitazone (ACTOS) 15 MG tablet, Take 15 mg by mouth 2 times daily  metFORMIN (GLUCOPHAGE) 850 MG tablet, Take 850 mg by mouth 2 times daily (with meals)  tamsulosin (FLOMAX) 0.4 MG capsule, Take 0.4 mg by mouth 2 times daily  aspirin 81 MG chewable tablet, Take 81 mg by mouth daily          No Known Allergies     Social History     Tobacco Use    Smoking status: Former Smoker    Smokeless tobacco: Never Used    Tobacco comment: 40 years quit   Substance Use Topics    Alcohol use: Not Currently        History reviewed. No pertinent family history. Review of Systems  Review of systems not obtained due to patient factors. Objective:     Blood pressure (!) 93/57, pulse 110, temperature 97.6 °F (36.4 °C), temperature source Rectal, resp. rate 13, height 6' (1.829 m), weight 254 lb 13.6 oz (115.6 kg), SpO2 91 %. General appearance: intubated, sedated  Head: Normocephalic, without obvious abnormality, anicteric  Lungs: ventilated  Heart: tachy, irregular  Abdomen: soft, non-tender. No rebound/guarding, Bowel sounds normal. No palpable masses. Extremities: extremities normal, atraumatic  Neuro: sedated    Data Review:    Recent Labs     12/12/20 0418 12/13/20 0426 12/14/20 0430   WBC 11.6* 18.0* 33.4*   HGB 15.3 14.9 10.1*   HCT 45.7 45.0 33.4*   MCV 96.7 97.1 104.7*    251 256     Recent Labs     12/12/20 0418 12/13/20 0426   * 133*   K 4.7 4.6    103   CO2 21 20*   BUN 29* 40*   CREATININE 0.8 0.8     Recent Labs     12/12/20 0418 12/13/20 0426   AST 21 16   ALT 47* 40   BILITOT 1.2* 1.0   ALKPHOS 83 78     No results for input(s): LIPASE, AMYLASE in the last 72 hours. Recent Labs     12/12/20 0418 12/13/20 0426   PROT 5.5* 5.6*     No results for input(s): PTT in the last 72 hours. No results for input(s): OCCULTBLD in the last 72 hours.

## 2020-12-15 LAB
ATYPICAL LYMPHOCYTE RELATIVE PERCENT: 8 % (ref 0–6)
BANDED NEUTROPHILS RELATIVE PERCENT: 14 % (ref 0–7)
BASOPHILS ABSOLUTE: 0 K/UL (ref 0–0.2)
BASOPHILS RELATIVE PERCENT: 0 %
EOSINOPHILS ABSOLUTE: 0.5 K/UL (ref 0–0.6)
EOSINOPHILS RELATIVE PERCENT: 1 %
HCT VFR BLD CALC: 41.1 % (ref 40.5–52.5)
HEMATOLOGY PATH CONSULT: NORMAL
HEMATOLOGY PATH CONSULT: YES
HEMOGLOBIN: 12.5 G/DL (ref 13.5–17.5)
LYMPHOCYTES ABSOLUTE: 7.3 K/UL (ref 1–5.1)
LYMPHOCYTES RELATIVE PERCENT: 8 %
MACROCYTES: ABNORMAL
MCH RBC QN AUTO: 30 PG (ref 26–34)
MCHC RBC AUTO-ENTMCNC: 30.4 G/DL (ref 31–36)
MCV RBC AUTO: 98.7 FL (ref 80–100)
METAMYELOCYTES RELATIVE PERCENT: 5 %
MONOCYTES ABSOLUTE: 3.2 K/UL (ref 0–1.3)
MONOCYTES RELATIVE PERCENT: 7 %
NEUTROPHILS ABSOLUTE: 34.9 K/UL (ref 1.7–7.7)
NEUTROPHILS RELATIVE PERCENT: 57 %
OVALOCYTES: ABNORMAL
PDW BLD-RTO: 17.7 % (ref 12.4–15.4)
PLATELET # BLD: 317 K/UL (ref 135–450)
PLATELET SLIDE REVIEW: ADEQUATE
PMV BLD AUTO: 10.4 FL (ref 5–10.5)
RBC # BLD: 4.16 M/UL (ref 4.2–5.9)
SLIDE REVIEW: ABNORMAL
WBC # BLD: 45.9 K/UL (ref 4–11)

## 2020-12-17 LAB
EKG ATRIAL RATE: 100 BPM
EKG ATRIAL RATE: 125 BPM
EKG ATRIAL RATE: 170 BPM
EKG DIAGNOSIS: NORMAL
EKG Q-T INTERVAL: 250 MS
EKG Q-T INTERVAL: 304 MS
EKG Q-T INTERVAL: 376 MS
EKG QRS DURATION: 114 MS
EKG QRS DURATION: 114 MS
EKG QRS DURATION: 128 MS
EKG QTC CALCULATION (BAZETT): 387 MS
EKG QTC CALCULATION (BAZETT): 431 MS
EKG QTC CALCULATION (BAZETT): 454 MS
EKG R AXIS: -47 DEGREES
EKG R AXIS: -52 DEGREES
EKG R AXIS: -57 DEGREES
EKG T AXIS: 1 DEGREES
EKG T AXIS: 59 DEGREES
EKG T AXIS: 64 DEGREES
EKG VENTRICULAR RATE: 121 BPM
EKG VENTRICULAR RATE: 144 BPM
EKG VENTRICULAR RATE: 88 BPM

## 2024-06-15 NOTE — PROGRESS NOTES
Problem: Pain  Goal: Acceptable pain level achieved/maintained at rest using appropriate pain scale for the patient  Outcome: Adequate for discharge  Goal: Acceptable pain level achieved/maintained with activity using appropriate pain scale for the patient  Outcome: Adequate for discharge  Goal: Acceptable pain level achieved/maintained without oversedation  Outcome: Adequate for discharge     Problem: Postoperative Care  Goal: Vital signs are maintained within parameters  Outcome: Adequate for discharge  Goal: Elimination status is maintained/returned to baseline  Outcome: Adequate for discharge  Goal: Oral intake is resumed and tolerated  Outcome: Adequate for discharge  Goal: Activity level is resumed to level needed for d/c  Outcome: Adequate for discharge  Goal: Verbalizes understanding of postoperative care in the hospital and after d/c  Description: Document on Patient Education Activity  Outcome: Adequate for discharge    Patient passing gas, active bowel sounds. Tolerating regular diet. Up ad felix. K 3.2 this morning, PO supplementation given. AVS instructions explained to patient.  to transport home.   Shift assessment complete. Patient in bed and on Airvo 55L FiO2 75% and stable. VSS. Denies any sob. Medication administered per order.  Call light in reach

## (undated) DEVICE — FORCEPS BX L240CM WRK CHN 2.8MM STD CAP W/ NDL MIC MESH

## (undated) DEVICE — BW-412T DISP COMBO CLEANING BRUSH: Brand: SINGLE USE COMBINATION CLEANING BRUSH

## (undated) DEVICE — PROCEDURE KIT ENDOSCP CUST

## (undated) DEVICE — NEEDLE 25GAX5.0MM INJ CARR LOCKE

## (undated) DEVICE — MOUTHPIECE ENDOSCP L CTRL OPN AND SIDE PORTS DISP

## (undated) DEVICE — SOLUTION IV IRRIG WATER 500ML POUR BRL ST 2F7113

## (undated) DEVICE — BIPOLAR ELECTROHEMOSTASIS CATHETER: Brand: GOLD PROBE 350CM

## (undated) DEVICE — SET VLV 3 PC AWS DISPOSABLE GRDIAN SCOPEVALET